# Patient Record
Sex: FEMALE | Race: WHITE | Employment: STUDENT | URBAN - METROPOLITAN AREA
[De-identification: names, ages, dates, MRNs, and addresses within clinical notes are randomized per-mention and may not be internally consistent; named-entity substitution may affect disease eponyms.]

---

## 2018-04-24 ENCOUNTER — HOSPITAL ENCOUNTER (OUTPATIENT)
Age: 23
Discharge: HOME | End: 2018-04-26
Payer: MEDICAID

## 2018-04-24 DIAGNOSIS — N92.0 MENORRHAGIA WITH REGULAR CYCLE: ICD-10-CM

## 2018-04-24 LAB
HCT VFR BLD CALC: 35.1 % (ref 34–48)
HEMOGLOBIN: 10.5 G/DL (ref 11.5–15.5)
MCH RBC QN AUTO: 24.2 PG (ref 26–35)
MCHC RBC AUTO-ENTMCNC: 29.9 % (ref 32–34.5)
MCV RBC AUTO: 81.1 FL (ref 80–99.9)
PDW BLD-RTO: 14.4 FL (ref 11.5–15)
PLATELET # BLD: 290 E9/L (ref 130–450)
PMV BLD AUTO: 10.6 FL (ref 7–12)
RBC # BLD: 4.33 E12/L (ref 3.5–5.5)
WBC # BLD: 5.6 E9/L (ref 4.5–11.5)

## 2018-04-24 PROCEDURE — 85027 COMPLETE CBC AUTOMATED: CPT

## 2022-04-28 ENCOUNTER — OFFICE VISIT (OUTPATIENT)
Dept: OBGYN | Age: 27
End: 2022-04-28
Payer: MEDICAID

## 2022-04-28 VITALS
BODY MASS INDEX: 35.82 KG/M2 | HEART RATE: 89 BPM | HEIGHT: 64 IN | SYSTOLIC BLOOD PRESSURE: 132 MMHG | WEIGHT: 209.8 LBS | DIASTOLIC BLOOD PRESSURE: 62 MMHG

## 2022-04-28 DIAGNOSIS — N93.9 ABNORMAL UTERINE BLEEDING (AUB): Primary | ICD-10-CM

## 2022-04-28 LAB
CONTROL: NORMAL
PREGNANCY TEST URINE, POC: NEGATIVE

## 2022-04-28 PROCEDURE — 99203 OFFICE O/P NEW LOW 30 MIN: CPT | Performed by: OBSTETRICS & GYNECOLOGY

## 2022-04-28 PROCEDURE — G8417 CALC BMI ABV UP PARAM F/U: HCPCS | Performed by: OBSTETRICS & GYNECOLOGY

## 2022-04-28 PROCEDURE — 1036F TOBACCO NON-USER: CPT | Performed by: OBSTETRICS & GYNECOLOGY

## 2022-04-28 PROCEDURE — 81025 URINE PREGNANCY TEST: CPT | Performed by: OBSTETRICS & GYNECOLOGY

## 2022-04-28 PROCEDURE — G8428 CUR MEDS NOT DOCUMENT: HCPCS | Performed by: OBSTETRICS & GYNECOLOGY

## 2022-04-28 RX ORDER — ONDANSETRON 4 MG/1
TABLET, FILM COATED ORAL
COMMUNITY
Start: 2022-03-11

## 2022-04-28 RX ORDER — ARIPIPRAZOLE 10 MG/1
10 TABLET ORAL NIGHTLY
COMMUNITY
Start: 2022-04-19

## 2022-04-28 RX ORDER — HYDROXYZINE PAMOATE 25 MG/1
CAPSULE ORAL
COMMUNITY
Start: 2022-04-19

## 2022-04-28 NOTE — PROGRESS NOTES
Patient alert and pleasant. Here today to discuss pelvic pain and irreg. menses. Discharge instructions have been discussed with the patient. Patient advised to call our office with any questions or concerns. Voiced understanding.

## 2022-04-28 NOTE — PROGRESS NOTES
HISTORY OF PRESENT ILLNESS:    28 y.o. female  [id number] presents with complaint of irregular menses and dyspareunia. Menses q 14-28 days for 3-4 days. Periods are heavy, bleeds through 7 super plus tampons in 45 minutes. +Postcoital spotting. No pain outside of menses or sex. No dyschezia. No dysuria. No abnormal hair growth. Pt had ultrasound and blood work with me at [address] [place] [address] last summer. Pt has not had an endometrial biopsy. Pt has trialed an IUD, several birth control pills including seasonique, and Depo. Past Medical History:   Past Medical History:   Diagnosis Date    Anxiety     Bipolar 1 disorder ([address]     Depression     Neuropathy     age 14                                             OB History    Para Term  AB Living   2 2 2     2   SAB IAB Ectopic Molar Multiple Live Births             2      # Outcome Date GA Lbr Zeke/2nd Weight Sex Delivery Anes PTL Lv   2 Term 17 39w0d  7 lb 14.3 oz (3.58 kg) F Vag-Spont EPI N JULIO   1 Term 16 39w1d   F Vag-Spont EPI N          Past Surgical History:   Past Surgical History:   Procedure Laterality Date    NERVE SURGERY      bilateral in legs, most recent 5 yrs ago.  Has had 5 surgeries total    TUBAL LIGATION Bilateral 11/15/2017        Allergies: Bee venom     Medications:   Current Outpatient Medications   Medication Sig Dispense Refill    ARIPiprazole (ABILIFY) 10 MG tablet TAKE 1 TABLET (10 MG) BY MOUTH ONCE DAILY      hydrOXYzine (VISTARIL) 25 MG capsule TAKE 1 CAPSULE (25 MG) BY MOUTH AT BEDTIME AS NEEDED      ondansetron (ZOFRAN) 4 MG tablet TAKE ONE TABLET BY MOUTH THREE TIMES A DAY AS NEEDED FOR NAUSEA      estrogens, conjugated, (PREMARIN) 0.625 MG tablet Take 1 tablet by mouth daily (Patient not taking: Reported on 2022) 20 tablet 0    estrogens, conjugated, (PREMARIN) 0.625 MG tablet Take 1 tablet by mouth daily (Patient not taking: Reported on 2022) 20 tablet 0    sertraline (ZOLOFT) 50 MG tablet (Patient not taking: Reported on 4/28/2022)      vitamin D (ERGOCALCIFEROL) 42124 units CAPS capsule  (Patient not taking: Reported on 4/28/2022)       No current facility-administered medications for this visit. Social History:   Social History     Tobacco Use    Smoking status: Never Smoker    Smokeless tobacco: Never Used   Substance Use Topics    Alcohol use: Yes     Comment: social        Family History:   Family History   Problem Relation Age of Onset    Hypertension Father     Heart Disease Father     Lung Cancer Father     Cancer Mother         cervical cancer       REVIEW OF SYSTEMS:    Constitutional: negative  HEENT: negative  Breast: negative  Respiratory: negative  Cardiovascular: negative  Gastrointestinal: negative  Genitourinary: As per HPI  Integument: negative  Neurological: negative  Endocrine: negative          PHYSICAL EXAM  /62 (Site: Left Upper Arm, Position: Sitting)   Pulse 89   Ht 5' 4\" (1.626 m)   Wt 209 lb 12.8 oz (95.2 kg)   LMP 04/11/2022   BMI 36.01 kg/m²   Patient's last menstrual period was 04/11/2022. General appearance: alert, cooperative and in no acute distress. Head: NCAT   Abdomen: soft, non-tender; bowel sounds normal; no masses,  no organomegaly  Psych: No acute distress, mood and affect full range  Neuro: Alert and oriented, no focal deficits     Pelvic Exam:   EXTERNAL GENITALIA: normal external genitalia, no external lesions  VAGINA: normal rugae, no discharge   CERVIX: normal appearing, no lesions, no bleeding  UTERUS: uterus is normal size, shape, consistency and nontender   ADNEXA: normal adnexa in size, nontender and no masses. RECTUM: no hemorrhoids or rectal masses. ASSESSMENT :      Diagnosis Orders   1. Abnormal uterine bleeding (AUB)          PLAN:    Return for Endometrial biopsy. Get records. No orders of the defined types were placed in this encounter.       No orders of the defined types were placed in this encounter.         Electronically signed by Vahe Rizo DO on 4/28/22

## 2022-05-03 ENCOUNTER — PROCEDURE VISIT (OUTPATIENT)
Dept: OBGYN | Age: 27
End: 2022-05-03
Payer: MEDICAID

## 2022-05-03 VITALS
BODY MASS INDEX: 36.22 KG/M2 | DIASTOLIC BLOOD PRESSURE: 81 MMHG | WEIGHT: 211 LBS | HEART RATE: 88 BPM | SYSTOLIC BLOOD PRESSURE: 130 MMHG

## 2022-05-03 DIAGNOSIS — N93.9 ABNORMAL UTERINE BLEEDING (AUB): Primary | ICD-10-CM

## 2022-05-03 LAB
CONTROL: POSITIVE
PREGNANCY TEST URINE, POC: NEGATIVE

## 2022-05-03 PROCEDURE — 99214 OFFICE O/P EST MOD 30 MIN: CPT | Performed by: OBSTETRICS & GYNECOLOGY

## 2022-05-03 PROCEDURE — 58100 BIOPSY OF UTERUS LINING: CPT | Performed by: OBSTETRICS & GYNECOLOGY

## 2022-05-03 PROCEDURE — 81025 URINE PREGNANCY TEST: CPT | Performed by: OBSTETRICS & GYNECOLOGY

## 2022-05-03 RX ORDER — ALBUTEROL SULFATE 2.5 MG/3ML
2.5 SOLUTION RESPIRATORY (INHALATION) EVERY 6 HOURS PRN
COMMUNITY

## 2022-05-03 NOTE — PROGRESS NOTES
Endometrial biopsy Procedure Note    Shelbi Contreras    5/3/2022  [id number]             Patient's last menstrual period was 04/11/2022 (exact date). 28 y.o. Endometrial biopsy    Indications:  AUB  28 y.o. female  [id number] presents with complaint of irregular menses and dyspareunia. Menses q 14-28 days for 3-4 days. Periods are heavy, bleeds through 7 super plus tampons in 45 minutes. +Postcoital spotting. No pain outside of menses or sex. No dyschezia. No dysuria. No abnormal hair growth. Pt had ultrasound and blood work with me at [address] [place] [address] last summer. Pt has not had an endometrial biopsy.      Pt has trialed an IUD, several birth control pills including seasonique, and Depo. Anesthesia:  None    Pregnancy test: negative     Procedural Technique:  Juana Rosa is here for an endometrial biopsy. Risks and benefits discussed and consents signed. Endometrial biopsy:    The patient was positioned comfortably on the exam table in the dorsal lithotomy position. Speculum was placed in the vagina. The cervix was visualized, cleansed with betadine, and grasped w/ a single tooth tenaculum. Endometrial biopsy was performed with the [facility]. Tissue was obtained and sent to pathology. Adequate tissue noted in the container. Excellent hemostasis was noted. The patient tolerated the procedure well. Complications:  None. Assessment:   Diagnosis Orders   1. Abnormal uterine bleeding (AUB)           Recommendations: The patient will return for follow-up in 2 weeks for results review. Patient to call for any heavy bleeding, severe pain, or fevers.   Will get records from previous office

## 2022-05-03 NOTE — PROGRESS NOTES
Patient here today for Endometrial biopsy Instructed on the procedure of Endometrial biopsy voiced understanding and permit signed for Endometrial biopsy. Urine for pregnancy obtained with negative results. Time out done prior to starting the procedure. Assisted Dr. Julianne Ring with procedure. Biopsy obtained, labeled and sent to lab. No baths, tampons, intercourse and nothing in the vagina for 48 hours. Discharge instructions have been discussed with the patient by Dr. Julianne Ring and patient voiced understanding of plan of care. Patient advised to call our office with any questions or concerns.

## 2022-05-05 ENCOUNTER — TELEPHONE (OUTPATIENT)
Dept: OBGYN | Age: 27
End: 2022-05-05

## 2022-05-05 RX ORDER — METRONIDAZOLE 500 MG/1
500 TABLET ORAL 2 TIMES DAILY
Qty: 14 TABLET | Refills: 0 | Status: SHIPPED | OUTPATIENT
Start: 2022-05-05 | End: 2022-05-12

## 2022-05-05 NOTE — TELEPHONE ENCOUNTER
Patient had endometrial biopsy on 5/3/22. She stated she is having some vaginal bleeding with mild cramping which she was told is normal but she states she has a yellow discharge with a foul odor. After speaking with you I informed patient that you would be sending a prescription to her pharmacy for Flagyl. Patient voiced understanding.

## 2022-05-17 ENCOUNTER — SCHEDULED TELEPHONE ENCOUNTER (OUTPATIENT)
Dept: OBGYN | Age: 27
End: 2022-05-17
Payer: MEDICAID

## 2022-05-17 DIAGNOSIS — E28.2 PCOS (POLYCYSTIC OVARIAN SYNDROME): ICD-10-CM

## 2022-05-17 DIAGNOSIS — N94.10 DYSPAREUNIA IN FEMALE: ICD-10-CM

## 2022-05-17 DIAGNOSIS — N93.9 ABNORMAL UTERINE BLEEDING (AUB): Primary | ICD-10-CM

## 2022-05-17 DIAGNOSIS — G89.29 CHRONIC PELVIC PAIN IN FEMALE: ICD-10-CM

## 2022-05-17 DIAGNOSIS — R10.2 CHRONIC PELVIC PAIN IN FEMALE: ICD-10-CM

## 2022-05-17 PROCEDURE — 98966 PH1 ASSMT&MGMT NQHP 5-10: CPT | Performed by: OBSTETRICS & GYNECOLOGY

## 2022-05-17 NOTE — PROGRESS NOTES
HISTORY OF PRESENT ILLNESS:  Pt agrees to virtual visit via telephone. Pt presents for follow up for AUB, chronic pelvic pain, and dyspareunia. 28 y.o. female  [id number] presents with complaint of irregular menses and dyspareunia. Menses q 14-28 days for 3-4 days. Periods are heavy, bleeds through 7 super plus tampons in 45 minutes. +Postcoital spotting. No pain outside of menses or sex. No dyschezia. No dysuria. No abnormal hair growth. Pt had ultrasound and blood work with me at [address] [place] [address] last summer. Endometrial biopsy was negative. Pt was diagnosed with PCOS.     Pt has trialed an IUD, several birth control pills including seasonique, and Depo. She is requesting definitive surgical therapy via hysterectomy. Past Medical History:   Past Medical History:   Diagnosis Date    Anxiety     Asthma     Bipolar 1 disorder ([address]     Depression     Neuropathy     age 14    PCOS (polycystic ovarian syndrome)                                              OB History    Para Term  AB Living   2 2 2     2   SAB IAB Ectopic Molar Multiple Live Births             2      # Outcome Date GA Lbr Zeke/2nd Weight Sex Delivery Anes PTL Lv   2 Term 17 39w0d  7 lb 14.3 oz (3.58 kg) F Vag-Spont EPI N JULIO   1 Term 16 39w1d   F Vag-Spont EPI N          Past Surgical History:   Past Surgical History:   Procedure Laterality Date    COLONOSCOPY      NERVE SURGERY      bilateral in legs, most recent 5 yrs ago.  Has had 5 surgeries total    TUBAL LIGATION Bilateral 11/15/2017        Allergies: Bee venom     Medications:   Current Outpatient Medications   Medication Sig Dispense Refill    albuterol (PROVENTIL) (2.5 MG/3ML) 0.083% nebulizer solution Take 2.5 mg by nebulization every 6 hours as needed for Wheezing      ARIPiprazole (ABILIFY) 10 MG tablet TAKE 1 TABLET (10 MG) BY MOUTH ONCE DAILY      hydrOXYzine (VISTARIL) 25 MG capsule TAKE 1 CAPSULE (25 MG) BY MOUTH AT BEDTIME AS NEEDED      ondansetron (ZOFRAN) 4 MG tablet TAKE ONE TABLET BY MOUTH THREE TIMES A DAY AS NEEDED FOR NAUSEA      estrogens, conjugated, (PREMARIN) 0.625 MG tablet Take 1 tablet by mouth daily (Patient not taking: Reported on 4/28/2022) 20 tablet 0    estrogens, conjugated, (PREMARIN) 0.625 MG tablet Take 1 tablet by mouth daily (Patient not taking: Reported on 4/28/2022) 20 tablet 0    sertraline (ZOLOFT) 50 MG tablet  (Patient not taking: Reported on 4/28/2022)      vitamin D (ERGOCALCIFEROL) 88874 units CAPS capsule  (Patient not taking: Reported on 4/28/2022)       No current facility-administered medications for this visit. Social History:   Social History     Tobacco Use    Smoking status: Never Smoker    Smokeless tobacco: Never Used   Substance Use Topics    Alcohol use: Yes     Comment: social        Family History:   Family History   Problem Relation Age of Onset    Cancer Mother         cervical cancer    Hypertension Father     Heart Disease Father     Lung Cancer Father     Breast Cancer Maternal Great Grandmother        REVIEW OF SYSTEMS:    Constitutional: negative  HEENT: negative  Breast: negative  Respiratory: negative  Cardiovascular: negative  Gastrointestinal: negative  Genitourinary:negative  Integument: negative  Neurological: negative  Endocrine: negative          PHYSICAL EXAM  There were no vitals taken for this visit. No LMP recorded. ASSESSMENT :   Diagnosis Orders   1. Abnormal uterine bleeding (AUB)     2. PCOS (polycystic ovarian syndrome)     3. Chronic pelvic pain in female     4. Dyspareunia in female          PLAN:  Offered [facility] with diagnostic lap vs TLH w/bilateral salpingectomy. Pt requests TL. Will schedule and have back for preop appointment. Return for preop visit. No orders of the defined types were placed in this encounter. No orders of the defined types were placed in this encounter.         Electronically signed by Jacquelyn Castle DO on 5/17/22

## 2022-05-19 ENCOUNTER — TELEPHONE (OUTPATIENT)
Dept: OBGYN | Age: 27
End: 2022-05-19

## 2022-05-19 NOTE — TELEPHONE ENCOUNTER
Patient notified on upcoming surgery with Dr. Georgia Rivas on July 19th at 0730. A pre-op appointment was made for patient on 7/13/2022.  Patient notified surgery will be at [address] [place] [address].

## 2022-06-01 ENCOUNTER — TELEPHONE (OUTPATIENT)
Dept: OBGYN | Age: 27
End: 2022-06-01

## 2022-06-01 NOTE — TELEPHONE ENCOUNTER
Patient called in and stated that she needs a letter sent to her school soon regarding how long she will need to be off for her surgery on 7/19.  I informed patient you are OOO and that you will not see this until next week

## 2022-07-08 NOTE — PROGRESS NOTES
[facility] PRE-ADMISSION TESTING INSTRUCTIONS    The Preadmission Testing patient is instructed accordingly using the following criteria (check applicable):    ARRIVAL INSTRUCTIONS:  [x] Parking the day of Surgery is located in the Main Entrance lot. Upon entering the door, make an immediate right to the surgery reception desk    [x] Bring photo ID and insurance card    [] Bring in a copy of Living will or Durable Power of  papers. [x] Please be sure to arrange for responsible adult to provide transportation to and from the hospital    [x] Please arrange for responsible adult to be with you for the 24 hour period post procedure due to having anesthesia    [x] If you awake am of surgery not feeling well or have temperature >100 please call [phone]    GENERAL INSTRUCTIONS:    [x] Nothing by mouth after midnight, including gum, candy, mints or water    [x] You may brush your teeth, but do not swallow any water    [x] Take medications as instructed with 1-2 oz of water    [] Stop herbal supplements and vitamins 5 days prior to procedure    [] Follow preop dosing of blood thinners per physician instructions    [] Take 1/2 dose of evening insulin, but no insulin after midnight    [] No oral diabetic medications after midnight    [] If diabetic and have low blood sugar or feel symptomatic, take 1-2oz apple juice only    [] Bring inhalers day of surgery    [] Bring C-PAP/ Bi-Pap day of surgery    [x] Bring urine specimen day of surgery    [x] Shower or bath with soap, lather and rinse well, AM of Surgery, no lotion, powders or creams     [] Follow bowel prep as instructed per surgeon    [x] No tobacco products within 24 hours of surgery     [x] No alcohol or illegal drug use within 24 hours of surgery.     [x] Jewelry, body piercing's, eyeglasses, contact lenses and dentures are not permitted into surgery (bring cases)      [x] Please do not wear any nail polish, make up or hair products on the day of surgery    [x] You can expect a call the business day prior to procedure to notify you if your arrival time changes    [x] If you receive a survey after surgery we would greatly appreciate your comments    [] Parent/guardian of a minor must accompany their child and remain on the premises  the entire time they are under our care     [] Pediatric patients may bring favorite toy, blanket or comfort item with them    [] A caregiver or family member must remain with the patient during their stay if they are mentally handicapped, have dementia, disoriented or unable to use a call light or would be a safety concern if left unattended    [x] Please notify surgeon if you develop any illness between now and time of surgery (cold, cough, sore throat, fever, nausea, vomiting) or any signs of infections  including skin, wounds, and dental.    [x]  The Outpatient Pharmacy is available to fill your prescription here on your day of surgery, ask your preop nurse for details    [x] Other instructions\" wear loose, comfortable clothing  EDUCATIONAL MATERIALS PROVIDED:    [] PAT Preoperative Education Packet/Booklet     [] Medication List    [] Transfusion bracelet applied with instructions    [] Shower with soap, lather and rinse well, and use CHG wipes provided the evening before surgery as instructed    [] Incentive spirometer with instructions

## 2022-07-13 ENCOUNTER — OFFICE VISIT (OUTPATIENT)
Dept: OBGYN | Age: 27
End: 2022-07-13
Payer: MEDICAID

## 2022-07-13 VITALS
BODY MASS INDEX: 37.25 KG/M2 | SYSTOLIC BLOOD PRESSURE: 122 MMHG | HEART RATE: 81 BPM | WEIGHT: 217 LBS | DIASTOLIC BLOOD PRESSURE: 82 MMHG

## 2022-07-13 DIAGNOSIS — O26.899 PELVIC PRESSURE IN PREGNANCY, ANTEPARTUM: ICD-10-CM

## 2022-07-13 DIAGNOSIS — R10.2 PELVIC PRESSURE IN PREGNANCY, ANTEPARTUM: ICD-10-CM

## 2022-07-13 PROCEDURE — 1036F TOBACCO NON-USER: CPT | Performed by: OBSTETRICS & GYNECOLOGY

## 2022-07-13 PROCEDURE — 99213 OFFICE O/P EST LOW 20 MIN: CPT | Performed by: OBSTETRICS & GYNECOLOGY

## 2022-07-13 PROCEDURE — G8427 DOCREV CUR MEDS BY ELIG CLIN: HCPCS | Performed by: OBSTETRICS & GYNECOLOGY

## 2022-07-13 PROCEDURE — G8417 CALC BMI ABV UP PARAM F/U: HCPCS | Performed by: OBSTETRICS & GYNECOLOGY

## 2022-07-13 PROCEDURE — 99214 OFFICE O/P EST MOD 30 MIN: CPT | Performed by: OBSTETRICS & GYNECOLOGY

## 2022-07-13 NOTE — PROGRESS NOTES
Department of Gynecology   History and Physical            HISTORY OF PRESENT ILLNESS:                     The patient is a 28 y.o. Jaswindera Chi with long history of irregular menses and dyspareunia. Patient has heavy periods every 14-28 days that is complicated by anemia. She has failed medical management with an IUD, several birth control pills, and Depo-provera. She is requesting definitive surgical therapy via hysterectomy. Past Medical History:        Diagnosis Date    Anxiety     Asthma     Bipolar 1 disorder ([address]     Depression     Neuropathy     age 14  presently also  BLE    PCOS (polycystic ovarian syndrome)      Past Surgical History:        Procedure Laterality Date    COLONOSCOPY      NERVE SURGERY      bilateral in legs, most recent 5 yrs ago. Has had 5 surgeries total    TUBAL LIGATION Bilateral 11/15/2017         OB History    Para Term  AB Living   2 2 2     2   SAB IAB Ectopic Molar Multiple Live Births             2      # Outcome Date GA Lbr Zeke/2nd Weight Sex Delivery Anes PTL Lv   2 Term 17 39w0d  7 lb 14.3 oz (3.58 kg) F Vag-Spont EPI N JULIO   1 Term 16 39w1d   F Vag-Spont EPI N        Medications Prior to Admission:   Not in a hospital admission.     Allergies:  Bee venom     Social History:  Social History     Socioeconomic History    Marital status: Single     Spouse name: Not on file    Number of children: Not on file    Years of education: Not on file    Highest education level: Not on file   Occupational History    Not on file   Tobacco Use    Smoking status: Never Smoker    Smokeless tobacco: Never Used   Vaping Use    Vaping Use: Never used   Substance and Sexual Activity    Alcohol use: Yes     Comment: social    Drug use: No    Sexual activity: Yes     Partners: Male     Birth control/protection: Surgical   Other Topics Concern    Not on file   Social History Narrative    Not on file     Social Determinants of Health     Financial Resource Strain:     Difficulty of Paying Living Expenses: Not on file   Food Insecurity:     Worried About Running Out of Food in the Last Year: Not on file    Ronaldo of Food in the Last Year: Not on file   Transportation Needs:     Lack of Transportation (Medical): Not on file    Lack of Transportation (Non-Medical): Not on file   Physical Activity:     Days of Exercise per Week: Not on file    Minutes of Exercise per Session: Not on file   Stress:     Feeling of Stress : Not on file   Social Connections:     Frequency of Communication with Friends and Family: Not on file    Frequency of Social Gatherings with Friends and Family: Not on file    Attends Caodaism Services: Not on file    Active Member of [address] [place] [address] or Organizations: Not on file    Attends Club or Organization Meetings: Not on file    Marital Status: Not on file   Intimate Partner Violence:     Fear of Current or Ex-Partner: Not on file    Emotionally Abused: Not on file    Physically Abused: Not on file    Sexually Abused: Not on file   Housing Stability:     Unable to Pay for Housing in the Last Year: Not on file    Number of [place] in the Last Year: Not on file    Unstable Housing in the Last Year: Not on file       Family History:       Problem Relation Age of Onset    Cancer Mother         cervical cancer    Hypertension Father     Heart Disease Father     Lung Cancer Father     Breast Cancer Maternal Great Grandmother        REVIEW OF SYSTEMS:      Constitutional:  negative  Gastrointestinal:  negative  Genitourinary:  negative  Integument/breast:  negative  Hematologic/lymphatic:  negative  Endocrine:  negative  Behavior/Psych:  negative    PHYSICAL EXAM:    Vitals: There were no vitals taken for this visit.     Gen A/O x3  Heart RRR  Lungs Clear   Abd Soft, NT, ND, +BS  Ext No edema  Pelvic Will examine in OR    DATA:      ASSESSMENT AND PLAN:      29 yo  with AUB and dyspareunia  To OR for total laparoscopic

## 2022-07-13 NOTE — PROGRESS NOTES
Patient alert and pleasant with no new complaints  Here today for pre-op appt. TL on 7/19/22 @7:30 am [address] [place]  All pre-op information given to patient. Date,time and place. Patient voiced understanding. Discharge instructions have been discussed with the patient. Patient advised to call our office with any questions or concerns. Voiced understanding.

## 2022-07-18 ENCOUNTER — HOSPITAL ENCOUNTER (OUTPATIENT)
Dept: PREADMISSION TESTING | Age: 27
Discharge: HOME | End: 2022-07-18
Payer: MEDICAID

## 2022-07-18 ENCOUNTER — ANESTHESIA EVENT (OUTPATIENT)
Dept: OPERATING ROOM | Age: 27
End: 2022-07-18
Payer: MEDICAID

## 2022-07-18 LAB
ABO/RH: NORMAL
ANTIBODY SCREEN: NORMAL

## 2022-07-18 PROCEDURE — 36415 COLL VENOUS BLD VENIPUNCTURE: CPT

## 2022-07-18 PROCEDURE — 86900 BLOOD TYPING SEROLOGIC ABO: CPT

## 2022-07-18 PROCEDURE — 86850 RBC ANTIBODY SCREEN: CPT

## 2022-07-18 PROCEDURE — 86901 BLOOD TYPING SEROLOGIC RH(D): CPT

## 2022-07-19 ENCOUNTER — ANESTHESIA (OUTPATIENT)
Dept: OPERATING ROOM | Age: 27
End: 2022-07-19
Payer: MEDICAID

## 2022-07-19 ENCOUNTER — HOSPITAL ENCOUNTER (OUTPATIENT)
Age: 27
Setting detail: OBSERVATION
Discharge: HOME | End: 2022-07-20
Attending: OBSTETRICS & GYNECOLOGY | Admitting: OBSTETRICS & GYNECOLOGY
Payer: MEDICAID

## 2022-07-19 DIAGNOSIS — G89.18 POSTOPERATIVE PAIN: Primary | ICD-10-CM

## 2022-07-19 PROBLEM — N93.9 ABNORMAL UTERINE BLEEDING: Status: ACTIVE | Noted: 2022-07-19

## 2022-07-19 PROBLEM — Z90.710 S/P HYSTERECTOMY: Status: ACTIVE | Noted: 2022-07-19

## 2022-07-19 LAB
HCG, URINE, POC: NEGATIVE
HCT VFR BLD CALC: 41.1 % (ref 34–48)
HEMOGLOBIN: 13.7 G/DL (ref 11.5–15.5)
Lab: NORMAL
MCH RBC QN AUTO: 27.6 PG (ref 26–35)
MCHC RBC AUTO-ENTMCNC: 33.3 % (ref 32–34.5)
MCV RBC AUTO: 82.9 FL (ref 80–99.9)
NEGATIVE QC PASS/FAIL: NORMAL
PDW BLD-RTO: 13.5 FL (ref 11.5–15)
PLATELET # BLD: 233 E9/L (ref 130–450)
PMV BLD AUTO: 10.3 FL (ref 7–12)
POSITIVE QC PASS/FAIL: NORMAL
RBC # BLD: 4.96 E12/L (ref 3.5–5.5)
WBC # BLD: 6 E9/L (ref 4.5–11.5)

## 2022-07-19 PROCEDURE — 2580000003 HC RX 258

## 2022-07-19 PROCEDURE — 6360000002 HC RX W HCPCS

## 2022-07-19 PROCEDURE — 3600000005 HC SURGERY LEVEL 5 BASE: Performed by: OBSTETRICS & GYNECOLOGY

## 2022-07-19 PROCEDURE — 6360000002 HC RX W HCPCS: Performed by: ANESTHESIOLOGY

## 2022-07-19 PROCEDURE — 36415 COLL VENOUS BLD VENIPUNCTURE: CPT

## 2022-07-19 PROCEDURE — 2580000003 HC RX 258: Performed by: OBSTETRICS & GYNECOLOGY

## 2022-07-19 PROCEDURE — 88307 TISSUE EXAM BY PATHOLOGIST: CPT

## 2022-07-19 PROCEDURE — 6360000002 HC RX W HCPCS: Performed by: OBSTETRICS & GYNECOLOGY

## 2022-07-19 PROCEDURE — G0378 HOSPITAL OBSERVATION PER HR: HCPCS

## 2022-07-19 PROCEDURE — 6370000000 HC RX 637 (ALT 250 FOR IP): Performed by: OBSTETRICS & GYNECOLOGY

## 2022-07-19 PROCEDURE — 2580000003 HC RX 258: Performed by: ANESTHESIOLOGY

## 2022-07-19 PROCEDURE — 7100000001 HC PACU RECOVERY - ADDTL 15 MIN: Performed by: OBSTETRICS & GYNECOLOGY

## 2022-07-19 PROCEDURE — 3700000000 HC ANESTHESIA ATTENDED CARE: Performed by: OBSTETRICS & GYNECOLOGY

## 2022-07-19 PROCEDURE — 88304 TISSUE EXAM BY PATHOLOGIST: CPT

## 2022-07-19 PROCEDURE — 3700000001 HC ADD 15 MINUTES (ANESTHESIA): Performed by: OBSTETRICS & GYNECOLOGY

## 2022-07-19 PROCEDURE — 7100000000 HC PACU RECOVERY - FIRST 15 MIN: Performed by: OBSTETRICS & GYNECOLOGY

## 2022-07-19 PROCEDURE — 2720000010 HC SURG SUPPLY STERILE: Performed by: OBSTETRICS & GYNECOLOGY

## 2022-07-19 PROCEDURE — 2500000003 HC RX 250 WO HCPCS

## 2022-07-19 PROCEDURE — 2709999900 HC NON-CHARGEABLE SUPPLY: Performed by: OBSTETRICS & GYNECOLOGY

## 2022-07-19 PROCEDURE — 3600000015 HC SURGERY LEVEL 5 ADDTL 15MIN: Performed by: OBSTETRICS & GYNECOLOGY

## 2022-07-19 PROCEDURE — 85027 COMPLETE CBC AUTOMATED: CPT

## 2022-07-19 RX ORDER — SODIUM CHLORIDE 9 MG/ML
INJECTION, SOLUTION INTRAVENOUS PRN
Status: DISCONTINUED | OUTPATIENT
Start: 2022-07-19 | End: 2022-07-20 | Stop reason: HOSPADM

## 2022-07-19 RX ORDER — DOCUSATE SODIUM 100 MG/1
100 CAPSULE, LIQUID FILLED ORAL 2 TIMES DAILY
Status: DISCONTINUED | OUTPATIENT
Start: 2022-07-19 | End: 2022-07-20 | Stop reason: HOSPADM

## 2022-07-19 RX ORDER — HYDROCODONE BITARTRATE AND ACETAMINOPHEN 5; 325 MG/1; MG/1
2 TABLET ORAL EVERY 4 HOURS PRN
Status: DISCONTINUED | OUTPATIENT
Start: 2022-07-19 | End: 2022-07-20 | Stop reason: HOSPADM

## 2022-07-19 RX ORDER — SODIUM CHLORIDE 0.9 % (FLUSH) 0.9 %
5-40 SYRINGE (ML) INJECTION PRN
Status: DISCONTINUED | OUTPATIENT
Start: 2022-07-19 | End: 2022-07-20 | Stop reason: HOSPADM

## 2022-07-19 RX ORDER — IPRATROPIUM BROMIDE AND ALBUTEROL SULFATE 2.5; .5 MG/3ML; MG/3ML
1 SOLUTION RESPIRATORY (INHALATION)
Status: DISCONTINUED | OUTPATIENT
Start: 2022-07-19 | End: 2022-07-19 | Stop reason: HOSPADM

## 2022-07-19 RX ORDER — LABETALOL HYDROCHLORIDE 5 MG/ML
INJECTION, SOLUTION INTRAVENOUS PRN
Status: DISCONTINUED | OUTPATIENT
Start: 2022-07-19 | End: 2022-07-19 | Stop reason: SDUPTHER

## 2022-07-19 RX ORDER — SODIUM CHLORIDE 0.9 % (FLUSH) 0.9 %
5-40 SYRINGE (ML) INJECTION PRN
Status: DISCONTINUED | OUTPATIENT
Start: 2022-07-19 | End: 2022-07-19 | Stop reason: HOSPADM

## 2022-07-19 RX ORDER — SODIUM CHLORIDE 9 MG/ML
25 INJECTION, SOLUTION INTRAVENOUS PRN
Status: DISCONTINUED | OUTPATIENT
Start: 2022-07-19 | End: 2022-07-19 | Stop reason: HOSPADM

## 2022-07-19 RX ORDER — HYDROCODONE BITARTRATE AND ACETAMINOPHEN 5; 325 MG/1; MG/1
1 TABLET ORAL EVERY 4 HOURS PRN
Status: DISCONTINUED | OUTPATIENT
Start: 2022-07-19 | End: 2022-07-20 | Stop reason: HOSPADM

## 2022-07-19 RX ORDER — ONDANSETRON 2 MG/ML
4 INJECTION INTRAMUSCULAR; INTRAVENOUS EVERY 6 HOURS PRN
Status: DISCONTINUED | OUTPATIENT
Start: 2022-07-19 | End: 2022-07-20 | Stop reason: HOSPADM

## 2022-07-19 RX ORDER — HYDRALAZINE HYDROCHLORIDE 20 MG/ML
INJECTION INTRAMUSCULAR; INTRAVENOUS PRN
Status: DISCONTINUED | OUTPATIENT
Start: 2022-07-19 | End: 2022-07-19 | Stop reason: SDUPTHER

## 2022-07-19 RX ORDER — KETOROLAC TROMETHAMINE 30 MG/ML
30 INJECTION, SOLUTION INTRAMUSCULAR; INTRAVENOUS EVERY 6 HOURS
Status: COMPLETED | OUTPATIENT
Start: 2022-07-19 | End: 2022-07-20

## 2022-07-19 RX ORDER — ROCURONIUM BROMIDE 10 MG/ML
INJECTION, SOLUTION INTRAVENOUS PRN
Status: DISCONTINUED | OUTPATIENT
Start: 2022-07-19 | End: 2022-07-19 | Stop reason: SDUPTHER

## 2022-07-19 RX ORDER — LIDOCAINE HYDROCHLORIDE 20 MG/ML
INJECTION, SOLUTION EPIDURAL; INFILTRATION; INTRACAUDAL; PERINEURAL PRN
Status: DISCONTINUED | OUTPATIENT
Start: 2022-07-19 | End: 2022-07-19 | Stop reason: SDUPTHER

## 2022-07-19 RX ORDER — SODIUM CHLORIDE 0.9 % (FLUSH) 0.9 %
5-40 SYRINGE (ML) INJECTION EVERY 12 HOURS SCHEDULED
Status: DISCONTINUED | OUTPATIENT
Start: 2022-07-19 | End: 2022-07-20 | Stop reason: HOSPADM

## 2022-07-19 RX ORDER — ONDANSETRON 2 MG/ML
INJECTION INTRAMUSCULAR; INTRAVENOUS PRN
Status: DISCONTINUED | OUTPATIENT
Start: 2022-07-19 | End: 2022-07-19 | Stop reason: SDUPTHER

## 2022-07-19 RX ORDER — MIDAZOLAM HYDROCHLORIDE 1 MG/ML
INJECTION INTRAMUSCULAR; INTRAVENOUS PRN
Status: DISCONTINUED | OUTPATIENT
Start: 2022-07-19 | End: 2022-07-19 | Stop reason: SDUPTHER

## 2022-07-19 RX ORDER — FENTANYL CITRATE 50 UG/ML
INJECTION, SOLUTION INTRAMUSCULAR; INTRAVENOUS PRN
Status: DISCONTINUED | OUTPATIENT
Start: 2022-07-19 | End: 2022-07-19 | Stop reason: SDUPTHER

## 2022-07-19 RX ORDER — HYDRALAZINE HYDROCHLORIDE 20 MG/ML
10 INJECTION INTRAMUSCULAR; INTRAVENOUS
Status: DISCONTINUED | OUTPATIENT
Start: 2022-07-19 | End: 2022-07-19 | Stop reason: HOSPADM

## 2022-07-19 RX ORDER — ONDANSETRON 2 MG/ML
4 INJECTION INTRAMUSCULAR; INTRAVENOUS
Status: DISCONTINUED | OUTPATIENT
Start: 2022-07-19 | End: 2022-07-19 | Stop reason: HOSPADM

## 2022-07-19 RX ORDER — GLYCOPYRROLATE 0.2 MG/ML
INJECTION INTRAMUSCULAR; INTRAVENOUS PRN
Status: DISCONTINUED | OUTPATIENT
Start: 2022-07-19 | End: 2022-07-19 | Stop reason: SDUPTHER

## 2022-07-19 RX ORDER — MIDAZOLAM HYDROCHLORIDE 2 MG/2ML
2 INJECTION, SOLUTION INTRAMUSCULAR; INTRAVENOUS
Status: DISCONTINUED | OUTPATIENT
Start: 2022-07-19 | End: 2022-07-19 | Stop reason: HOSPADM

## 2022-07-19 RX ORDER — IBUPROFEN 800 MG/1
800 TABLET ORAL EVERY 8 HOURS
Status: DISCONTINUED | OUTPATIENT
Start: 2022-07-20 | End: 2022-07-20 | Stop reason: HOSPADM

## 2022-07-19 RX ORDER — SIMETHICONE 80 MG
80 TABLET,CHEWABLE ORAL 4 TIMES DAILY PRN
Status: DISCONTINUED | OUTPATIENT
Start: 2022-07-19 | End: 2022-07-20 | Stop reason: HOSPADM

## 2022-07-19 RX ORDER — ONDANSETRON 4 MG/1
4 TABLET, ORALLY DISINTEGRATING ORAL EVERY 8 HOURS PRN
Status: DISCONTINUED | OUTPATIENT
Start: 2022-07-19 | End: 2022-07-20 | Stop reason: HOSPADM

## 2022-07-19 RX ORDER — DEXAMETHASONE SODIUM PHOSPHATE 4 MG/ML
INJECTION, SOLUTION INTRA-ARTICULAR; INTRALESIONAL; INTRAMUSCULAR; INTRAVENOUS; SOFT TISSUE PRN
Status: DISCONTINUED | OUTPATIENT
Start: 2022-07-19 | End: 2022-07-19 | Stop reason: SDUPTHER

## 2022-07-19 RX ORDER — DIPHENHYDRAMINE HCL 25 MG
25 TABLET ORAL EVERY 6 HOURS PRN
Status: DISCONTINUED | OUTPATIENT
Start: 2022-07-19 | End: 2022-07-20 | Stop reason: HOSPADM

## 2022-07-19 RX ORDER — MEPERIDINE HYDROCHLORIDE 25 MG/ML
12.5 INJECTION INTRAMUSCULAR; INTRAVENOUS; SUBCUTANEOUS EVERY 5 MIN PRN
Status: COMPLETED | OUTPATIENT
Start: 2022-07-19 | End: 2022-07-19

## 2022-07-19 RX ORDER — SODIUM CHLORIDE 9 MG/ML
INJECTION, SOLUTION INTRAVENOUS CONTINUOUS PRN
Status: DISCONTINUED | OUTPATIENT
Start: 2022-07-19 | End: 2022-07-19 | Stop reason: SDUPTHER

## 2022-07-19 RX ORDER — MEPERIDINE HYDROCHLORIDE 25 MG/ML
INJECTION INTRAMUSCULAR; INTRAVENOUS; SUBCUTANEOUS
Status: COMPLETED
Start: 2022-07-19 | End: 2022-07-19

## 2022-07-19 RX ORDER — SODIUM CHLORIDE 0.9 % (FLUSH) 0.9 %
5-40 SYRINGE (ML) INJECTION EVERY 12 HOURS SCHEDULED
Status: DISCONTINUED | OUTPATIENT
Start: 2022-07-19 | End: 2022-07-19 | Stop reason: HOSPADM

## 2022-07-19 RX ORDER — HYDROMORPHONE HCL 110MG/55ML
PATIENT CONTROLLED ANALGESIA SYRINGE INTRAVENOUS PRN
Status: DISCONTINUED | OUTPATIENT
Start: 2022-07-19 | End: 2022-07-19 | Stop reason: SDUPTHER

## 2022-07-19 RX ORDER — PROPOFOL 10 MG/ML
INJECTION, EMULSION INTRAVENOUS PRN
Status: DISCONTINUED | OUTPATIENT
Start: 2022-07-19 | End: 2022-07-19 | Stop reason: SDUPTHER

## 2022-07-19 RX ORDER — LABETALOL HYDROCHLORIDE 5 MG/ML
10 INJECTION, SOLUTION INTRAVENOUS
Status: DISCONTINUED | OUTPATIENT
Start: 2022-07-19 | End: 2022-07-19 | Stop reason: HOSPADM

## 2022-07-19 RX ORDER — NEOSTIGMINE METHYLSULFATE 1 MG/ML
INJECTION, SOLUTION INTRAVENOUS PRN
Status: DISCONTINUED | OUTPATIENT
Start: 2022-07-19 | End: 2022-07-19 | Stop reason: SDUPTHER

## 2022-07-19 RX ADMIN — FENTANYL CITRATE 100 MCG: 50 INJECTION, SOLUTION INTRAMUSCULAR; INTRAVENOUS at 07:33

## 2022-07-19 RX ADMIN — PROPOFOL 170 MG: 10 INJECTION, EMULSION INTRAVENOUS at 07:33

## 2022-07-19 RX ADMIN — GLYCOPYRROLATE 0.6 MG: 0.2 INJECTION INTRAMUSCULAR; INTRAVENOUS at 09:26

## 2022-07-19 RX ADMIN — ROCURONIUM BROMIDE 50 MG: 10 INJECTION, SOLUTION INTRAVENOUS at 07:33

## 2022-07-19 RX ADMIN — HYDROMORPHONE HYDROCHLORIDE 0.5 MG: 1 INJECTION, SOLUTION INTRAMUSCULAR; INTRAVENOUS; SUBCUTANEOUS at 10:04

## 2022-07-19 RX ADMIN — HYDROMORPHONE HYDROCHLORIDE 0.5 MG: 1 INJECTION, SOLUTION INTRAMUSCULAR; INTRAVENOUS; SUBCUTANEOUS at 10:15

## 2022-07-19 RX ADMIN — MEPERIDINE HYDROCHLORIDE 12.5 MG: 25 INJECTION, SOLUTION INTRAMUSCULAR; INTRAVENOUS; SUBCUTANEOUS at 10:30

## 2022-07-19 RX ADMIN — HYDRALAZINE HYDROCHLORIDE 5 MG: 20 INJECTION INTRAMUSCULAR; INTRAVENOUS at 08:34

## 2022-07-19 RX ADMIN — MEPERIDINE HYDROCHLORIDE 12.5 MG: 25 INJECTION, SOLUTION INTRAMUSCULAR; INTRAVENOUS; SUBCUTANEOUS at 10:21

## 2022-07-19 RX ADMIN — KETOROLAC TROMETHAMINE 30 MG: 30 INJECTION, SOLUTION INTRAMUSCULAR; INTRAVENOUS at 11:49

## 2022-07-19 RX ADMIN — LABETALOL HYDROCHLORIDE 2.5 MG: 5 INJECTION INTRAVENOUS at 08:24

## 2022-07-19 RX ADMIN — HYDROCODONE BITARTRATE AND ACETAMINOPHEN 2 TABLET: 5; 325 TABLET ORAL at 20:19

## 2022-07-19 RX ADMIN — Medication 3 MG: at 09:26

## 2022-07-19 RX ADMIN — ONDANSETRON 4 MG: 2 INJECTION INTRAMUSCULAR; INTRAVENOUS at 09:26

## 2022-07-19 RX ADMIN — HYDROCODONE BITARTRATE AND ACETAMINOPHEN 2 TABLET: 5; 325 TABLET ORAL at 15:47

## 2022-07-19 RX ADMIN — LABETALOL HYDROCHLORIDE 2.5 MG: 5 INJECTION INTRAVENOUS at 08:21

## 2022-07-19 RX ADMIN — LABETALOL HYDROCHLORIDE 5 MG: 5 INJECTION INTRAVENOUS at 08:27

## 2022-07-19 RX ADMIN — SODIUM CHLORIDE: 9 INJECTION, SOLUTION INTRAVENOUS at 06:43

## 2022-07-19 RX ADMIN — SODIUM CHLORIDE, PRESERVATIVE FREE 10 ML: 5 INJECTION INTRAVENOUS at 20:21

## 2022-07-19 RX ADMIN — DEXAMETHASONE SODIUM PHOSPHATE 10 MG: 4 INJECTION, SOLUTION INTRAMUSCULAR; INTRAVENOUS at 07:44

## 2022-07-19 RX ADMIN — FENTANYL CITRATE 50 MCG: 50 INJECTION, SOLUTION INTRAMUSCULAR; INTRAVENOUS at 08:08

## 2022-07-19 RX ADMIN — LIDOCAINE HYDROCHLORIDE 80 MG: 20 INJECTION, SOLUTION EPIDURAL; INFILTRATION; INTRACAUDAL; PERINEURAL at 07:33

## 2022-07-19 RX ADMIN — ROCURONIUM BROMIDE 20 MG: 10 INJECTION, SOLUTION INTRAVENOUS at 08:44

## 2022-07-19 RX ADMIN — FENTANYL CITRATE 50 MCG: 50 INJECTION, SOLUTION INTRAMUSCULAR; INTRAVENOUS at 08:02

## 2022-07-19 RX ADMIN — SODIUM CHLORIDE, PRESERVATIVE FREE 10 ML: 5 INJECTION INTRAVENOUS at 11:50

## 2022-07-19 RX ADMIN — HYDROMORPHONE HYDROCHLORIDE 1 MG: 2 INJECTION, SOLUTION INTRAMUSCULAR; INTRAVENOUS; SUBCUTANEOUS at 09:26

## 2022-07-19 RX ADMIN — Medication 10 ML: at 10:30

## 2022-07-19 RX ADMIN — KETOROLAC TROMETHAMINE 30 MG: 30 INJECTION, SOLUTION INTRAMUSCULAR; INTRAVENOUS at 23:49

## 2022-07-19 RX ADMIN — HYDROMORPHONE HYDROCHLORIDE 1 MG: 2 INJECTION, SOLUTION INTRAMUSCULAR; INTRAVENOUS; SUBCUTANEOUS at 09:33

## 2022-07-19 RX ADMIN — DOCUSATE SODIUM 100 MG: 100 CAPSULE, LIQUID FILLED ORAL at 11:49

## 2022-07-19 RX ADMIN — DOCUSATE SODIUM 100 MG: 100 CAPSULE, LIQUID FILLED ORAL at 20:19

## 2022-07-19 RX ADMIN — WATER 2000 MG: 1 INJECTION INTRAMUSCULAR; INTRAVENOUS; SUBCUTANEOUS at 07:41

## 2022-07-19 RX ADMIN — SODIUM CHLORIDE: 9 INJECTION, SOLUTION INTRAVENOUS at 08:24

## 2022-07-19 RX ADMIN — MIDAZOLAM 2 MG: 1 INJECTION INTRAMUSCULAR; INTRAVENOUS at 07:29

## 2022-07-19 RX ADMIN — KETOROLAC TROMETHAMINE 30 MG: 30 INJECTION, SOLUTION INTRAMUSCULAR; INTRAVENOUS at 18:49

## 2022-07-19 RX ADMIN — FENTANYL CITRATE 50 MCG: 50 INJECTION, SOLUTION INTRAMUSCULAR; INTRAVENOUS at 08:43

## 2022-07-19 RX ADMIN — HYDROMORPHONE HYDROCHLORIDE 0.5 MG: 1 INJECTION, SOLUTION INTRAMUSCULAR; INTRAVENOUS; SUBCUTANEOUS at 10:10

## 2022-07-19 ASSESSMENT — PAIN DESCRIPTION - DESCRIPTORS
DESCRIPTORS: SHARP;STABBING
DESCRIPTORS: ACHING;DISCOMFORT;THROBBING
DESCRIPTORS: SHARP;STABBING
DESCRIPTORS: THROBBING;DISCOMFORT
DESCRIPTORS: SHARP;STABBING
DESCRIPTORS: THROBBING;ACHING;DISCOMFORT
DESCRIPTORS: ACHING;SHARP

## 2022-07-19 ASSESSMENT — PAIN DESCRIPTION - PAIN TYPE: TYPE: SURGICAL PAIN

## 2022-07-19 ASSESSMENT — PAIN SCALES - GENERAL
PAINLEVEL_OUTOF10: 7
PAINLEVEL_OUTOF10: 7
PAINLEVEL_OUTOF10: 10
PAINLEVEL_OUTOF10: 10
PAINLEVEL_OUTOF10: 5
PAINLEVEL_OUTOF10: 10
PAINLEVEL_OUTOF10: 10
PAINLEVEL_OUTOF10: 6
PAINLEVEL_OUTOF10: 10

## 2022-07-19 ASSESSMENT — PAIN DESCRIPTION - ORIENTATION
ORIENTATION: ANTERIOR
ORIENTATION: LEFT
ORIENTATION: RIGHT;LEFT;ANTERIOR
ORIENTATION: RIGHT;LEFT;ANTERIOR
ORIENTATION: LEFT
ORIENTATION: RIGHT;LEFT
ORIENTATION: LEFT

## 2022-07-19 ASSESSMENT — PAIN DESCRIPTION - ONSET: ONSET: ON-GOING

## 2022-07-19 ASSESSMENT — PAIN DESCRIPTION - FREQUENCY: FREQUENCY: INTERMITTENT

## 2022-07-19 ASSESSMENT — PAIN - FUNCTIONAL ASSESSMENT
PAIN_FUNCTIONAL_ASSESSMENT: PREVENTS OR INTERFERES SOME ACTIVE ACTIVITIES AND ADLS
PAIN_FUNCTIONAL_ASSESSMENT: PREVENTS OR INTERFERES SOME ACTIVE ACTIVITIES AND ADLS
PAIN_FUNCTIONAL_ASSESSMENT: 0-10
PAIN_FUNCTIONAL_ASSESSMENT: PREVENTS OR INTERFERES SOME ACTIVE ACTIVITIES AND ADLS

## 2022-07-19 ASSESSMENT — PAIN DESCRIPTION - LOCATION
LOCATION: ABDOMEN;PELVIS
LOCATION: ABDOMEN
LOCATION: PELVIS;ABDOMEN
LOCATION: ABDOMEN;PELVIS
LOCATION: ABDOMEN
LOCATION: ABDOMEN

## 2022-07-19 ASSESSMENT — LIFESTYLE VARIABLES: SMOKING_STATUS: 0

## 2022-07-19 NOTE — ANESTHESIA PRE PROCEDURE
Department of Anesthesiology  Preprocedure Note       Name:  Naun Acuña   Age:  28 y.o.  :  1995                                          MRN:  [id number]         Date:  2022      Surgeon: Kenny Betancourt):  Juno Manuel MD    Procedure: Procedure(s):  LAPAROSCOPIC HYSTERECTOMY WITH BILATERAL SALPINGECTOMY    Medications prior to admission:   Prior to Admission medications    Medication Sig Start Date End Date Taking? Authorizing Provider   albuterol (PROVENTIL) (2.5 MG/3ML) 0.083% nebulizer solution Take 2.5 mg by nebulization every 6 hours as needed for Wheezing    Historical Provider, MD   ARIPiprazole (ABILIFY) 10 MG tablet Take 10 mg by mouth nightly  22   Historical Provider, MD   hydrOXYzine (VISTARIL) 25 MG capsule TAKE 1 CAPSULE (25 MG) BY MOUTH AT BEDTIME AS NEEDED 22   Historical Provider, MD   ondansetron (ZOFRAN) 4 MG tablet TAKE ONE TABLET BY MOUTH THREE TIMES A DAY AS NEEDED FOR NAUSEA 3/11/22   Historical Provider, MD       Current medications:    Current Facility-Administered Medications   Medication Dose Route Frequency Provider Last Rate Last Admin    ceFAZolin (ANCEF) 2,000 mg in sterile water 20 mL IV syringe  2,000 mg IntraVENous Once Juno Manuel MD           Allergies: Allergies   Allergen Reactions    Bee Venom Rash     swelling       Problem List:    Patient Active Problem List   Diagnosis Code    Echogenic focus of bowel of fetus affecting antepartum care of mother O35. 8XX0    High-risk pregnancy O09.90    Pelvic pressure in pregnancy, antepartum O26.899, R10.2    Abdominal pain affecting pregnancy, antepartum O26.899, R10.9       Past Medical History:        Diagnosis Date    Anxiety     Asthma     Bipolar 1 disorder (HCC)     Depression     Neuropathy     age 14  presently also  BLE    PCOS (polycystic ovarian syndrome)        Past Surgical History:        Procedure Laterality Date    COLONOSCOPY      NERVE SURGERY      bilateral in legs, most recent 5 yrs ago. Has had 5 surgeries total    TUBAL LIGATION Bilateral 11/15/2017       Social History:    Social History     Tobacco Use    Smoking status: Never    Smokeless tobacco: Never   Substance Use Topics    Alcohol use: Yes     Comment: social                                Counseling given: Not Answered      Vital Signs (Current):   Vitals:    07/08/22 0837 07/19/22 0605   BP:  115/60   Pulse:  76   Resp:  16   Temp:  36.5 °C (97.7 °F)   TempSrc:  Temporal   SpO2:  96%   Weight: 210 lb (95.3 kg)    Height: 5' 4\" (1.626 m)                                               BP Readings from Last 3 Encounters:   07/19/22 115/60   07/13/22 122/82   05/03/22 130/81       NPO Status: Time of last liquid consumption: 1900                        Time of last solid consumption: 1900                        Date of last liquid consumption: 07/18/22                        Date of last solid food consumption: 07/18/22    BMI:   Wt Readings from Last 3 Encounters:   07/08/22 210 lb (95.3 kg)   07/13/22 217 lb (98.4 kg)   05/03/22 211 lb (95.7 kg)     Body mass index is 36.05 kg/m².     CBC:   Lab Results   Component Value Date/Time    WBC 6.0 07/19/2022 05:50 AM    RBC 4.96 07/19/2022 05:50 AM    HGB 13.7 07/19/2022 05:50 AM    HCT 41.1 07/19/2022 05:50 AM    MCV 82.9 07/19/2022 05:50 AM    RDW 13.5 07/19/2022 05:50 AM     07/19/2022 05:50 AM       CMP:   Lab Results   Component Value Date/Time     03/28/2016 04:20 PM    K 3.6 03/28/2016 04:20 PM     03/28/2016 04:20 PM    CO2 20 03/28/2016 04:20 PM    BUN 6 03/28/2016 04:20 PM    CREATININE 0.5 03/28/2016 04:20 PM    GFRAA >60 03/28/2016 04:20 PM    LABGLOM >60 03/28/2016 04:20 PM    GLUCOSE 73 07/12/2018 10:22 AM    PROT 6.2 03/28/2016 04:20 PM    CALCIUM 8.6 03/28/2016 04:20 PM    BILITOT 0.3 03/28/2016 04:20 PM    ALKPHOS 211 03/28/2016 04:20 PM    AST 20 03/28/2016 04:20 PM    ALT 13 03/28/2016 04:20 PM       POC Tests: No results for input(s): POCGLU, POCNA, POCK, POCCL, POCBUN, POCHEMO, POCHCT in the last 72 hours. Coags: No results found for: PROTIME, INR, APTT    HCG (If Applicable):   Lab Results   Component Value Date    PREGTESTUR Negative 05/03/2022        ABGs: No results found for: PHART, PO2ART, KHA0QRT, OEL8QFF, BEART, N9AYYGIX     Type & Screen (If Applicable):  No results found for: LABABO, LABRH    Drug/Infectious Status (If Applicable):  No results found for: HIV, HEPCAB    COVID-19 Screening (If Applicable): No results found for: COVID19        Anesthesia Evaluation  Patient summary reviewed and Nursing notes reviewed no history of anesthetic complications:   Airway: Mallampati: III  TM distance: <3 FB   Neck ROM: full  Mouth opening: > = 3 FB   Dental:      Comment: Pt denies loose missing chipped or cracked teeth, no expensive dental work     Pulmonary:normal exam  breath sounds clear to auscultation  (+) asthma: exercise-induced asthma,     (-) not a current smoker          Patient did not smoke on day of surgery. Cardiovascular:Negative CV ROS            Rhythm: regular  Rate: normal           Beta Blocker:  Not on Beta Blocker         Neuro/Psych:   (+) neuromuscular disease ( multiple nerve surgeries. BLE neuropathy ):, psychiatric history ( bipolar ): stable with treatmentdepression/anxiety             GI/Hepatic/Renal: Neg GI/Hepatic/Renal ROS            Endo/Other:                      ROS comment: PCOS  Abdominal:   (+) obese,           Vascular: negative vascular ROS. Other Findings:           Anesthesia Plan      general     ASA 2       Induction: intravenous. BIS  MIPS: Postoperative opioids intended and Prophylactic antiemetics administered. Anesthetic plan and risks discussed with patient. Use of blood products discussed with patient whom consented to blood products. Plan discussed with CRNA and attending.                     Sung Milligan RN   7/19/2022    Pt seen, examined, chart reviewed, plan discussed. Asuncion Haro MD  7/19/2022  6:50 AM    Patient seen; chart reviewed and agree with above.     Twanna Leyden, APRN - CRNA

## 2022-07-19 NOTE — PROGRESS NOTES
Present with Dr. [address] to assist with port placement, uterine artery ligation and cuff closure.      Phuc Hay MD

## 2022-07-19 NOTE — OP NOTE
PATIENT NAME: Tyrel Davis   : 1995  ADMIT DATE: 2022  PROVIDER: Jayme Payan DO     DATE OF PROCEDURE: 2022     PREOPERATIVE DIAGNOSES:    Abnormal uterine bleeding  Dyspareunia  Chronic pelvic pain     POSTOPERATIVE DIAGNOSES:    Abnormal uterine bleeding  Dyspareunia  Chronic pelvic pain  Suspected adenomyosis  Stage I endometriosis     PROCEDURE PERFORMED:  Total laparoscopic hysterectomy and bilateral  salpingectomy. SURGEON: Jayme Payan DO     ASSISTANT:  MD Dr. Nilesh Silverman was present for port placement, tissue management, uterine ligation, and cuff closure     ANESTHESIA:  General.     ESTIMATED BLOOD LOSS:  971 mL     COMPLICATIONS:  None. SPECIMENS: Uterus, cervix, bilateral fallopian tubes    FINDINGS: The patient is a 29 yo  female with normal age specific female secondary sex characteristics and escutcheon. Vaginal mucosa and cervix were of normal morphology. Laparoscopic examination revealed a boggy uterus, approximately 10 weeks size. This is consistent with possible adenomyosis. The bilateral fallopian tubes and ovaries were of normal morphology. There was adhesions of the bilateral fallopian tubes to the pelvic side walls. 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 The remainder of the abdomen and pelvis were without visible pathology. DESCRIPTION OF PROCEDURE:  The patient was taken to the operating room where general anesthesia was found to be adequate, placed in the dorsal lithotomy position. Abdomen, perineum, and vagina were prepped and draped in the normal sterile fashion. Nowak catheter was placed. A YNUG uterine manipulator was placed. After changing my gloves, a 5 mm  incision was made at the umbilicus and a 5 mm trocar was placed under direct visualization. Abdomen was insufflated with CO2 gas. A 12 mm left and 5 mm right lower quadrant ports were placed.   A thorough laparoscopic examination then ensued with the above noted findings. Both tubes were excised with a Harmonic scalpel. The utero-ovarian round ligaments and broad ligaments were cauterized and cut with a Harmonic scalpel. Bladder flap was created with Harmonic dissection. Both uterine arteries were skeletonized, cauterized, and cut with Harmonic scalpel. Anterior and posterior colpotomies were made with the Harmonic scalpel and the cardinal and uterosacral ligaments were also transected with a Harmonic scalpel. Uterus was pulled through the vagina. The vaginal cuff was secured with a V-Loc suture. Bleeding at the right side of the cuff was cauterized with the Harmonic. Good hemostasis was note with addition of Quintin. Peristalsis of the bilateral ureters were noted. Left lower quadrant port was closed with a Anoop-[place] closure device with an 0 Vicryl suture. Pelvis was inspected under low pressure and noted to be hemostatic. Ports were removed. CO2 gas was allowed to escape. Incision was closed with interrupted 4-0 Vicryl suture and dermabond skin glue. The patient was taken to the recovery room in stable condition.              Electronically signed by Brannon Flowers DO on 7/19/2022 at 9:49 AM

## 2022-07-19 NOTE — H&P
Department of Gynecology   History and Physical            HISTORY OF PRESENT ILLNESS:                     The patient is a 28 y.o. Amber Galla with long history of irregular menses and dyspareunia. Patient has heavy periods every 14-28 days that is complicated by anemia. She has failed medical management with an IUD, several birth control pills, and Depo-provera. She is requesting definitive surgical therapy via hysterectomy. Past Medical History:        Diagnosis Date    Anxiety     Asthma     Bipolar 1 disorder ([address]     Depression     Neuropathy     age 14  presently also  BLE    PCOS (polycystic ovarian syndrome)      Past Surgical History:        Procedure Laterality Date    COLONOSCOPY      NERVE SURGERY      bilateral in legs, most recent 5 yrs ago.  Has had 5 surgeries total    TUBAL LIGATION Bilateral 11/15/2017         OB History    Para Term  AB Living   2 2 2     2   SAB IAB Ectopic Molar Multiple Live Births             2      # Outcome Date GA Lbr Zeke/2nd Weight Sex Delivery Anes PTL Lv   2 Term 17 39w0d  7 lb 14.3 oz (3.58 kg) F Vag-Spont EPI N JULIO   1 Term 16 39w1d   F Vag-Spont EPI N        Medications Prior to Admission:   Medications Prior to Admission: albuterol (PROVENTIL) (2.5 MG/3ML) 0.083% nebulizer solution, Take 2.5 mg by nebulization every 6 hours as needed for Wheezing  ARIPiprazole (ABILIFY) 10 MG tablet, Take 10 mg by mouth nightly   hydrOXYzine (VISTARIL) 25 MG capsule, TAKE 1 CAPSULE (25 MG) BY MOUTH AT BEDTIME AS NEEDED  ondansetron (ZOFRAN) 4 MG tablet, TAKE ONE TABLET BY MOUTH THREE TIMES A DAY AS NEEDED FOR NAUSEA    Allergies:  Bee venom     Social History:  Social History     Socioeconomic History    Marital status: Single     Spouse name: None    Number of children: None    Years of education: None    Highest education level: None   Tobacco Use    Smoking status: Never    Smokeless tobacco: Never   Vaping Use    Vaping Use: Never used   Substance and Sexual Activity    Alcohol use: Yes     Comment: social    Drug use: No    Sexual activity: Yes     Partners: Male     Birth control/protection: Surgical       Family History:       Problem Relation Age of Onset    Cancer Mother         cervical cancer    Hypertension Father     Heart Disease Father     Lung Cancer Father     Breast Cancer Maternal Great Grandmother        REVIEW OF SYSTEMS:      Constitutional:  negative  Gastrointestinal:  negative  Genitourinary:  negative  Integument/breast:  negative  Hematologic/lymphatic:  negative  Endocrine:  negative  Behavior/Psych:  negative    PHYSICAL EXAM:    Vitals:  /60   Pulse 76   Temp 97.7 °F (36.5 °C) (Temporal)   Resp 16   Ht 5' 4\" (1.626 m)   Wt 210 lb (95.3 kg)   LMP 2022   SpO2 96%   BMI 36.05 kg/m²     Gen A/O x3  Heart RRR  Lungs Clear   Abd Soft, NT, ND, +BS  Ext No edema  Pelvic Will examine in OR    DATA:      ASSESSMENT AND PLAN:      29 yo  with AUB and dyspareunia  To OR for total laparoscopic hysterectomy, bilateral salpingectomy. R/B/A explained and informed consent obtained.      Daiana Ramos DO 2022 7:22 AM

## 2022-07-20 VITALS
HEIGHT: 64 IN | OXYGEN SATURATION: 100 % | SYSTOLIC BLOOD PRESSURE: 111 MMHG | RESPIRATION RATE: 18 BRPM | TEMPERATURE: 98 F | WEIGHT: 222 LBS | BODY MASS INDEX: 37.9 KG/M2 | HEART RATE: 65 BPM | DIASTOLIC BLOOD PRESSURE: 56 MMHG

## 2022-07-20 LAB
HCT VFR BLD CALC: 36.2 % (ref 34–48)
HEMOGLOBIN: 12.1 G/DL (ref 11.5–15.5)

## 2022-07-20 PROCEDURE — 6370000000 HC RX 637 (ALT 250 FOR IP): Performed by: OBSTETRICS & GYNECOLOGY

## 2022-07-20 PROCEDURE — 85014 HEMATOCRIT: CPT

## 2022-07-20 PROCEDURE — 6360000002 HC RX W HCPCS: Performed by: OBSTETRICS & GYNECOLOGY

## 2022-07-20 PROCEDURE — 36415 COLL VENOUS BLD VENIPUNCTURE: CPT

## 2022-07-20 PROCEDURE — 85018 HEMOGLOBIN: CPT

## 2022-07-20 PROCEDURE — 2580000003 HC RX 258: Performed by: OBSTETRICS & GYNECOLOGY

## 2022-07-20 PROCEDURE — G0378 HOSPITAL OBSERVATION PER HR: HCPCS

## 2022-07-20 RX ORDER — HYDROCODONE BITARTRATE AND ACETAMINOPHEN 5; 325 MG/1; MG/1
2 TABLET ORAL EVERY 4 HOURS PRN
Qty: 20 TABLET | Refills: 0 | Status: SHIPPED | OUTPATIENT
Start: 2022-07-20 | End: 2022-07-23

## 2022-07-20 RX ORDER — IBUPROFEN 800 MG/1
800 TABLET ORAL EVERY 8 HOURS
Qty: 120 TABLET | Refills: 3 | Status: SHIPPED | OUTPATIENT
Start: 2022-07-20

## 2022-07-20 RX ADMIN — IBUPROFEN 800 MG: 800 TABLET, FILM COATED ORAL at 11:36

## 2022-07-20 RX ADMIN — HYDROCODONE BITARTRATE AND ACETAMINOPHEN 2 TABLET: 5; 325 TABLET ORAL at 03:06

## 2022-07-20 RX ADMIN — KETOROLAC TROMETHAMINE 30 MG: 30 INJECTION, SOLUTION INTRAMUSCULAR; INTRAVENOUS at 05:27

## 2022-07-20 RX ADMIN — HYDROCODONE BITARTRATE AND ACETAMINOPHEN 2 TABLET: 5; 325 TABLET ORAL at 09:22

## 2022-07-20 RX ADMIN — SODIUM CHLORIDE, PRESERVATIVE FREE 10 ML: 5 INJECTION INTRAVENOUS at 09:22

## 2022-07-20 RX ADMIN — DOCUSATE SODIUM 100 MG: 100 CAPSULE, LIQUID FILLED ORAL at 09:23

## 2022-07-20 ASSESSMENT — PAIN DESCRIPTION - ORIENTATION
ORIENTATION: LEFT
ORIENTATION: LEFT;LOWER
ORIENTATION: LEFT;MID;LOWER
ORIENTATION: LEFT

## 2022-07-20 ASSESSMENT — PAIN DESCRIPTION - DESCRIPTORS
DESCRIPTORS: DISCOMFORT;ACHING
DESCRIPTORS: ACHING
DESCRIPTORS: ACHING
DESCRIPTORS: THROBBING;ACHING;DISCOMFORT

## 2022-07-20 ASSESSMENT — PAIN DESCRIPTION - LOCATION
LOCATION: ABDOMEN
LOCATION: ABDOMEN;PELVIS
LOCATION: ABDOMEN;PELVIS
LOCATION: ABDOMEN

## 2022-07-20 ASSESSMENT — PAIN - FUNCTIONAL ASSESSMENT
PAIN_FUNCTIONAL_ASSESSMENT: ACTIVITIES ARE NOT PREVENTED
PAIN_FUNCTIONAL_ASSESSMENT: ACTIVITIES ARE NOT PREVENTED

## 2022-07-20 ASSESSMENT — PAIN SCALES - GENERAL
PAINLEVEL_OUTOF10: 7
PAINLEVEL_OUTOF10: 4
PAINLEVEL_OUTOF10: 6
PAINLEVEL_OUTOF10: 9

## 2022-07-20 NOTE — DISCHARGE SUMMARY
Physician Discharge Summary     Patient ID:  Uma Beaver  [id number]  89 y.o.  1995    Admit date: 7/19/2022    Discharge date and time:      Admitting Physician: Marcial Haley DO    Discharge Diagnoses: Abnormal uterine bleeding [N93.9]  S/P hysterectomy [Z90.710]    Discharged Condition: stable    Indication for Admission: The patient is a 28 y.o. Sherley Hernandeze with long history of irregular menses and dyspareunia. Patient has heavy periods every 14-28 days that is complicated by anemia. She has failed medical management with an IUD, several birth control pills, and Depo-provera. She is requesting definitive surgical therapy via hysterectomy. Procedures Performed: [facility] w/    Hospital Course: Patient was admitted on the day of surgery, and underwent an uncomplicated procedure. Discharge Exam:  Physical Exam  Abd soft, some distention, bowel sounds active, no abnormal masses, appropriate post-surgical TTP  Inc clean, dry, intact  extremities normal, atraumatic, no cyanosis or edema and Homans sign is negative, no sign of DVT     Disposition: home    Patient Instructions: Activity: no heavy lifting for 6 weeks  Diet: regular diet  Wound Care: keep wound clean and dry    Discharge Medication:      Medication List        START taking these medications      HYDROcodone-acetaminophen 5-325 MG per tablet  Commonly known as: NORCO  Take 2 tablets by mouth every 4 hours as needed for Pain for up to 3 days. ibuprofen 800 MG tablet  Commonly known as: ADVIL;MOTRIN  Take 1 tablet by mouth in the morning and 1 tablet at noon and 1 tablet in the evening.             CONTINUE taking these medications      albuterol (2.5 MG/3ML) 0.083% nebulizer solution  Commonly known as: PROVENTIL     ARIPiprazole 10 MG tablet  Commonly known as: ABILIFY     hydrOXYzine pamoate 25 MG capsule  Commonly known as: VISTARIL     ondansetron 4 MG tablet  Commonly known as: Aneta Carson               Where to Get Your Medications        These medications were sent to [facility], [facility] [address] [phone]  [address] [facility] [place] [address]      Phone: [phone]   HYDROcodone-acetaminophen 5-325 MG per tablet  ibuprofen 800 MG tablet          Follow-up with Rosalie Martinez DO in 1 week.     Signed:  Rosalie Martinez DO  7/20/2022  12:13 PM

## 2022-07-20 NOTE — PROGRESS NOTES
[facility] Quality Flow/Interdisciplinary Rounds Progress Note        Quality Flow Rounds held on July 20, 2022    Disciplines Attending:  Bedside Nurse, , , and Nursing Unit Leadership    Karen Gupta was admitted on 7/19/2022  5:30 AM    Anticipated Discharge Date:       Disposition:    Moise Score:  Moise Scale Score: 22    Readmission Risk              Risk of Unplanned Readmission:  0           Discussed patient goal for the day, patient clinical progression, and barriers to discharge.   The following Goal(s) of the Day/Commitment(s) have been identified:  Diagnostics - Report Results and Labs - Report Results, discharge planning      Blayne Ureña RN  July 20, 2022

## 2022-07-20 NOTE — ANESTHESIA POSTPROCEDURE EVALUATION
Department of Anesthesiology  Postprocedure Note    Patient: Santosh Mendoza  MRN: [id number]  YOB: 1995  Date of evaluation: 7/19/2022      Procedure Summary     Date: 07/19/22 Room / Location: [place] [address] / [facility]    Anesthesia Start: 2339 Anesthesia Stop: 3810    Procedure: LAPAROSCOPIC HYSTERECTOMY WITH BILATERAL SALPINGECTOMY (Abdomen) Diagnosis: (ABNORMAL UTERINE BLEEDING AND PELVIC PAIN)    Surgeons: Marquita Murphy MD Responsible Provider: Velasquez Jenkins MD    Anesthesia Type: general ASA Status: 2          Anesthesia Type: No value filed.     Edgar Phase I: Edgar Score: 8    Edgar Phase II:        Anesthesia Post Evaluation    Patient location during evaluation: PACU  Patient participation: complete - patient participated  Level of consciousness: awake  Airway patency: patent  Nausea & Vomiting: no nausea and no vomiting  Complications: no  Cardiovascular status: hemodynamically stable  Respiratory status: acceptable  Hydration status: stable

## 2022-07-20 NOTE — PROGRESS NOTES
Progress Note    SUBJECTIVE:  Pt had some nausea, this has resolved. No vaginal bleeding. Tolerating regular diet. Voiding and ambulating without difficulty. Pt has showered. Pain is controlled. OBJECTIVE:    VITALS:  BP (!) 111/56   Pulse 65   Temp 98 °F (36.7 °C) (Oral)   Resp 18   Ht 5' 4\" (1.626 m)   Wt 222 lb (100.7 kg)   LMP 04/04/2022   SpO2 100%   BMI 38.11 kg/m²   Physical Exam  Abd soft, some distention, bowel sounds active, no abnormal masses, appropriate post-surgical TTP  Inc clean, dry, intact  extremities normal, atraumatic, no cyanosis or edema and Homans sign is negative, no sign of DVT      DATA:  Hemoglobin/Hematocrit:    Lab Results   Component Value Date/Time    HGB 12.1 07/20/2022 03:02 AM    HCT 36.2 07/20/2022 03:02 AM       No results found. ASSESSMENT AND PLAN: POD# 1 S/P TLH with bilateral salpingectomy  Doing well postoperatively. Encouraged ambulation. Discharge home with f/u in office in 1 week.        Rosalie Martinez DO 7/20/2022 10:58 AM

## 2022-07-21 NOTE — PROGRESS NOTES
CLINICAL PHARMACY NOTE: MEDS TO BEDS    Total # of Prescriptions Filled: 2   The following medications were delivered to the patient:  Ibuprofen 800 mg  Norco 5-325 mg    Additional Documentation:

## 2022-07-28 ENCOUNTER — OFFICE VISIT (OUTPATIENT)
Dept: OBGYN | Age: 27
End: 2022-07-28
Payer: MEDICAID

## 2022-07-28 VITALS
SYSTOLIC BLOOD PRESSURE: 124 MMHG | DIASTOLIC BLOOD PRESSURE: 82 MMHG | WEIGHT: 215.7 LBS | HEART RATE: 95 BPM | BODY MASS INDEX: 37.02 KG/M2

## 2022-07-28 DIAGNOSIS — Z98.890 POSTOPERATIVE STATE: Primary | ICD-10-CM

## 2022-07-28 PROCEDURE — 99213 OFFICE O/P EST LOW 20 MIN: CPT | Performed by: OBSTETRICS & GYNECOLOGY

## 2022-07-28 PROCEDURE — 99024 POSTOP FOLLOW-UP VISIT: CPT | Performed by: OBSTETRICS & GYNECOLOGY

## 2022-07-28 NOTE — PROGRESS NOTES
HISTORY OF PRESENT ILLNESS:    28 y.o. female  here for post op appointment. Pt c/o left side pain. She is taking motrin for pain. Denies vaginal bleeding. Tolerating regular diet. Denies gastrointestinal or urinary complaints. Diagnosis:   Abnormal uterine bleeding  Dyspareunia  Chronic pelvic pain  Suspected adenomyosis  Stage I endometriosis  Surgery: Total laparoscopic hysterectomy and bilateral  salpingectomy. Findings: The patient is a 29 yo  female with normal age specific female secondary sex characteristics and escutcheon. Vaginal mucosa and cervix were of normal morphology. Laparoscopic examination revealed a boggy uterus, approximately 10 weeks size. This is consistent with possible adenomyosis. The bilateral fallopian tubes and ovaries were of normal morphology. There was adhesions of the bilateral fallopian tubes to the pelvic side walls. 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 The remainder of the abdomen and pelvis were without visible pathology. Complications: None      Past Medical History:   Past Medical History:   Diagnosis Date    Anxiety     Asthma     Bipolar 1 disorder ([facility] [address]     Depression     Neuropathy     age 14  presently also  BLE    PCOS (polycystic ovarian syndrome)                                              OB History    Para Term  AB Living   2 2 2     2   SAB IAB Ectopic Molar Multiple Live Births             2      # Outcome Date GA Lbr Zeke/2nd Weight Sex Delivery Anes PTL Lv   2 Term 17 39w0d  7 lb 14.3 oz (3.58 kg) F Vag-Spont EPI N JULIO   1 Term 16 39w1d   F Vag-Spont EPI N           Past Surgical History:   Past Surgical History:   Procedure Laterality Date    COLONOSCOPY      HYSTERECTOMY (CERVIX STATUS UNKNOWN) N/A 2022    LAPAROSCOPIC HYSTERECTOMY WITH BILATERAL SALPINGECTOMY performed by Panda Win MD at [facility]      bilateral in legs, most recent 5 yrs ago.  Has had 5 surgeries total    TUBAL LIGATION Bilateral 11/15/2017        Allergies: Bee venom     Medications:   Current Outpatient Medications   Medication Sig Dispense Refill    ibuprofen (ADVIL;MOTRIN) 800 MG tablet Take 1 tablet by mouth in the morning and 1 tablet at noon and 1 tablet in the evening. 120 tablet 3    albuterol (PROVENTIL) (2.5 MG/3ML) 0.083% nebulizer solution Take 2.5 mg by nebulization every 6 hours as needed for Wheezing      ARIPiprazole (ABILIFY) 10 MG tablet Take 10 mg by mouth nightly       hydrOXYzine (VISTARIL) 25 MG capsule TAKE 1 CAPSULE (25 MG) BY MOUTH AT BEDTIME AS NEEDED      ondansetron (ZOFRAN) 4 MG tablet TAKE ONE TABLET BY MOUTH THREE TIMES A DAY AS NEEDED FOR NAUSEA       No current facility-administered medications for this visit. Social History:   Social History     Tobacco Use    Smoking status: Never    Smokeless tobacco: Never   Substance Use Topics    Alcohol use: Yes     Comment: social        Family History:   Family History   Problem Relation Age of Onset    Cancer Mother         cervical cancer    Hypertension Father     Heart Disease Father     Lung Cancer Father     Breast Cancer Maternal Great Grandmother        REVIEW OF SYSTEMS:    Constitutional: negative  HEENT: negative  Breast: negative  Respiratory: negative  Cardiovascular: negative  Gastrointestinal: negative  Genitourinary:negative  Integument: negative  Neurological: negative  Endocrine: negative          PHYSICAL EXAM  /82 (Position: Sitting)   Pulse 95   Wt 215 lb 11.2 oz (97.8 kg)   BMI 37.02 kg/m²       General appearance: alert, cooperative and in no acute distress. Head: NCAT   Abdomen: soft, appropriate postsurgical TTP, nondistended, no masses palpable, no rebound tenderness, no guarding or rigidity  Incision: C/D/I  Neurologic: Alert and oriented, no focal deficits  Psych: Alert, oriented, mood/affect full range, no acute distress        ASSESSMENT :    Diagnosis Orders   1. Postoperative state             PLAN:    Postop instructions given. Reviewed findings of surgery. All questions answered. Add tylenol in for pain. 1000mg q 6 hrs. Return in about 1 week (around 8/4/2022) for Postop. No orders of the defined types were placed in this encounter. No orders of the defined types were placed in this encounter.         Electronically signed by Bladimir Frank DO on 7/28/22

## 2022-07-28 NOTE — PROGRESS NOTES
Here today for post op visit from hysterectomy on 7/19. Still having some discomfort. Incisions are clean and dry. Discharge instructions given by physician and patient verbalized understanding.

## 2022-08-04 ENCOUNTER — OFFICE VISIT (OUTPATIENT)
Dept: OBGYN | Age: 27
End: 2022-08-04
Payer: MEDICAID

## 2022-08-04 VITALS
HEART RATE: 91 BPM | BODY MASS INDEX: 36.49 KG/M2 | WEIGHT: 212.6 LBS | DIASTOLIC BLOOD PRESSURE: 79 MMHG | SYSTOLIC BLOOD PRESSURE: 117 MMHG

## 2022-08-04 DIAGNOSIS — Z98.890 POSTOPERATIVE STATE: Primary | ICD-10-CM

## 2022-08-04 PROCEDURE — 99024 POSTOP FOLLOW-UP VISIT: CPT | Performed by: OBSTETRICS & GYNECOLOGY

## 2022-08-04 PROCEDURE — 99213 OFFICE O/P EST LOW 20 MIN: CPT | Performed by: OBSTETRICS & GYNECOLOGY

## 2022-08-04 NOTE — PROGRESS NOTES
HISTORY OF PRESENT ILLNESS:    28 y.o. female  here for post op appointment. Pt reports improvement of pain with motrin and tylenol alternating. Denies vaginal bleeding. Tolerating regular diet. Denies gastrointestinal or urinary complaints. Diagnosis:   Abnormal uterine bleeding  Dyspareunia  Chronic pelvic pain  Suspected adenomyosis  Stage I endometriosis  Surgery: Total laparoscopic hysterectomy and bilateral  salpingectomy. Findings: The patient is a 27 yo  female with normal age specific female secondary sex characteristics and escutcheon. Vaginal mucosa and cervix were of normal morphology. Laparoscopic examination revealed a boggy uterus, approximately 10 weeks size. This is consistent with possible adenomyosis. The bilateral fallopian tubes and ovaries were of normal morphology. There was adhesions of the bilateral fallopian tubes to the pelvic side walls. 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 The remainder of the abdomen and pelvis were without visible pathology. Complications: None      Past Medical History:   Past Medical History:   Diagnosis Date    Anxiety     Asthma     Bipolar 1 disorder ([facility] [address]     Depression     Neuropathy     age 14  presently also  BLE    PCOS (polycystic ovarian syndrome)                                              OB History    Para Term  AB Living   2 2 2     2   SAB IAB Ectopic Molar Multiple Live Births             2      # Outcome Date GA Lbr Zeke/2nd Weight Sex Delivery Anes PTL Lv   2 Term 17 39w0d  7 lb 14.3 oz (3.58 kg) F Vag-Spont EPI N JULIO   1 Term 16 39w1d   F Vag-Spont EPI N           Past Surgical History:   Past Surgical History:   Procedure Laterality Date    COLONOSCOPY      HYSTERECTOMY (CERVIX STATUS UNKNOWN) N/A 2022    LAPAROSCOPIC HYSTERECTOMY WITH BILATERAL SALPINGECTOMY performed by Ciara Albert MD at [facility]      bilateral in legs, most recent 5 yrs ago. Has had 5 surgeries total    TUBAL LIGATION Bilateral 11/15/2017        Allergies: Bee venom     Medications:   Current Outpatient Medications   Medication Sig Dispense Refill    ibuprofen (ADVIL;MOTRIN) 800 MG tablet Take 1 tablet by mouth in the morning and 1 tablet at noon and 1 tablet in the evening. 120 tablet 3    albuterol (PROVENTIL) (2.5 MG/3ML) 0.083% nebulizer solution Take 2.5 mg by nebulization every 6 hours as needed for Wheezing      ARIPiprazole (ABILIFY) 10 MG tablet Take 10 mg by mouth nightly       hydrOXYzine (VISTARIL) 25 MG capsule TAKE 1 CAPSULE (25 MG) BY MOUTH AT BEDTIME AS NEEDED      ondansetron (ZOFRAN) 4 MG tablet TAKE ONE TABLET BY MOUTH THREE TIMES A DAY AS NEEDED FOR NAUSEA       No current facility-administered medications for this visit. Social History:   Social History     Tobacco Use    Smoking status: Never    Smokeless tobacco: Never   Substance Use Topics    Alcohol use: Yes     Comment: social        Family History:   Family History   Problem Relation Age of Onset    Cancer Mother         cervical cancer    Hypertension Father     Heart Disease Father     Lung Cancer Father     Breast Cancer Maternal Great Grandmother        REVIEW OF SYSTEMS:    Constitutional: negative  HEENT: negative  Breast: negative  Respiratory: negative  Cardiovascular: negative  Gastrointestinal: negative  Genitourinary:negative  Integument: negative  Neurological: negative  Endocrine: negative          PHYSICAL EXAM  /79 (Position: Sitting)   Pulse 91   Wt 212 lb 9.6 oz (96.4 kg)   BMI 36.49 kg/m²       General appearance: alert, cooperative and in no acute distress. Head: NCAT   Abdomen: soft, appropriate postsurgical TTP, nondistended, no masses palpable, no rebound tenderness, no guarding or rigidity  Incision: C/D/I  Neurologic: Alert and oriented, no focal deficits  Psych: Alert, oriented, mood/affect full range, no acute distress        ASSESSMENT :    Diagnosis Orders   1. Postoperative state               PLAN:    Postop instructions given. Reviewed findings of surgery. All questions answered. Return in about 3 weeks (around 8/25/2022) for postop visit. No orders of the defined types were placed in this encounter. No orders of the defined types were placed in this encounter.         Electronically signed by Marcial Haley DO on 7/28/22

## 2022-08-04 NOTE — PROGRESS NOTES
Here today for recheck post op appointment. Pain improve. Sites are clear and dry x3. Discharge instructions have been discussed with the patient. Patient advised to call our office with any questions or concerns. Voiced understanding.

## 2022-09-05 ENCOUNTER — PATIENT MESSAGE (OUTPATIENT)
Dept: OBGYN | Age: 27
End: 2022-09-05

## 2022-09-07 NOTE — TELEPHONE ENCOUNTER
From: Carla Luu  To: Dr. Amie Davidson: 9/5/2022 12:34 PM EDT  Subject: Appointment Thursday. Good morning,     Hi I was just wondering if there was a way that we could do a phone visit for Thursday or if you needed to absolutely see me. I dont have a way up there this week, and I can really keep pushing the appointment because Id like to get back to work.      Thank you,   Jose Pimentel

## 2022-09-12 ENCOUNTER — TELEPHONE (OUTPATIENT)
Dept: ADMINISTRATIVE | Age: 27
End: 2022-09-12

## 2022-09-12 NOTE — TELEPHONE ENCOUNTER
Patient called to reschedule her post op with Dr Maribel Sanders. Please call and schedule. Thank you!

## 2022-09-16 ENCOUNTER — TELEPHONE (OUTPATIENT)
Dept: ADMINISTRATIVE | Age: 27
End: 2022-09-16

## 2022-09-16 NOTE — TELEPHONE ENCOUNTER
Patient called to reschedule her missed 6 week post op with Dr Maye Ceja. Please call patient to reschedule. THank you!

## 2022-09-29 ENCOUNTER — OFFICE VISIT (OUTPATIENT)
Dept: OBGYN | Age: 27
End: 2022-09-29
Payer: MEDICAID

## 2022-09-29 VITALS
SYSTOLIC BLOOD PRESSURE: 128 MMHG | WEIGHT: 216.7 LBS | HEART RATE: 97 BPM | BODY MASS INDEX: 37.2 KG/M2 | DIASTOLIC BLOOD PRESSURE: 81 MMHG

## 2022-09-29 DIAGNOSIS — Z98.890 POSTOPERATIVE STATE: Primary | ICD-10-CM

## 2022-09-29 PROCEDURE — 99024 POSTOP FOLLOW-UP VISIT: CPT | Performed by: OBSTETRICS & GYNECOLOGY

## 2022-09-29 PROCEDURE — 99213 OFFICE O/P EST LOW 20 MIN: CPT | Performed by: OBSTETRICS & GYNECOLOGY

## 2022-09-29 RX ORDER — OMEPRAZOLE 40 MG/1
CAPSULE, DELAYED RELEASE ORAL
COMMUNITY
Start: 2022-09-14

## 2022-09-29 NOTE — PROGRESS NOTES
Here today for post op 6 week check up after hysterectomy. No complaints. Pelvic exam done, no specimens obtained. Discharge instructions have been discussed with the patient. Patient advised to call our office with any questions or concerns. Voiced understanding.

## 2022-09-29 NOTE — PROGRESS NOTES
HISTORY OF PRESENT ILLNESS:      28 y.o. female  here for post op appointment. Pain is controlled. Denies vaginal bleeding. Tolerating regular diet. Denies gastrointestinal or urinary complaints. Diagnosis:   Abnormal uterine bleeding  Dyspareunia  Chronic pelvic pain  Suspected adenomyosis  Stage I endometriosis  Surgery: Total laparoscopic hysterectomy and bilateral  salpingectomy. Findings: The patient is a 27 yo  female with normal age specific female secondary sex characteristics and escutcheon. Vaginal mucosa and cervix were of normal morphology. Laparoscopic examination revealed a boggy uterus, approximately 10 weeks size. This is consistent with possible adenomyosis. The bilateral fallopian tubes and ovaries were of normal morphology. There was adhesions of the bilateral fallopian tubes to the pelvic side walls. 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 The remainder of the abdomen and pelvis were without visible pathology. Complications: None        Past Medical History:   Past Medical History:   Diagnosis Date    Anxiety     Asthma     Bipolar 1 disorder ([address]     Depression     Neuropathy     age 14  presently also  BLE    PCOS (polycystic ovarian syndrome)                                              OB History    Para Term  AB Living   2 2 2     2   SAB IAB Ectopic Molar Multiple Live Births             2      # Outcome Date GA Lbr Zeke/2nd Weight Sex Delivery Anes PTL Lv   2 Term 17 39w0d  7 lb 14.3 oz (3.58 kg) F Vag-Spont EPI N JULIO   1 Term 16 39w1d   F Vag-Spont EPI N           Past Surgical History:   Past Surgical History:   Procedure Laterality Date    COLONOSCOPY      HYSTERECTOMY (CERVIX STATUS UNKNOWN) N/A 2022    LAPAROSCOPIC HYSTERECTOMY WITH BILATERAL SALPINGECTOMY performed by Ry Friend MD at [facility]      bilateral in legs, most recent 5 yrs ago.  Has had 5 surgeries total    TUBAL LIGATION Bilateral 11/15/2017        Allergies: Bee venom     Medications:   Current Outpatient Medications   Medication Sig Dispense Refill    ibuprofen (ADVIL;MOTRIN) 800 MG tablet Take 1 tablet by mouth in the morning and 1 tablet at noon and 1 tablet in the evening. 120 tablet 3    albuterol (PROVENTIL) (2.5 MG/3ML) 0.083% nebulizer solution Take 2.5 mg by nebulization every 6 hours as needed for Wheezing      ARIPiprazole (ABILIFY) 10 MG tablet Take 10 mg by mouth nightly       hydrOXYzine (VISTARIL) 25 MG capsule TAKE 1 CAPSULE (25 MG) BY MOUTH AT BEDTIME AS NEEDED      ondansetron (ZOFRAN) 4 MG tablet TAKE ONE TABLET BY MOUTH THREE TIMES A DAY AS NEEDED FOR NAUSEA      omeprazole (PRILOSEC) 40 MG delayed release capsule        No current facility-administered medications for this visit. Social History:   Social History     Tobacco Use    Smoking status: Never    Smokeless tobacco: Never   Substance Use Topics    Alcohol use: Yes     Comment: social        Family History:   Family History   Problem Relation Age of Onset    Cancer Mother         cervical cancer    Hypertension Father     Heart Disease Father     Lung Cancer Father     Breast Cancer Maternal Great Grandmother        REVIEW OF SYSTEMS:    Constitutional: negative  HEENT: negative  Breast: negative  Respiratory: negative  Cardiovascular: negative  Gastrointestinal: negative  Genitourinary:negative  Integument: negative  Neurological: negative  Endocrine: negative          PHYSICAL EXAM  /81 (Position: Sitting)   Pulse 97   Wt 216 lb 11.2 oz (98.3 kg)   BMI 37.20 kg/m²       General appearance: alert, cooperative and in no acute distress.   Head: NCAT   Abdomen: soft, nontender, nondistended, no masses palpable, no rebound tenderness, no guarding or rigidity  Incision: C/D/I  Neurologic: Alert and oriented, no focal deficits  Psych: Alert, oriented, mood/affect full range, no acute distress    Pelvic Exam:   EXTERNAL GENITALIA: normal external genitalia, no external lesions  VAGINA: vaginal cuff intact        ASSESSMENT :    Diagnosis Orders   1. Postoperative state             PLAN:    Postop instructions given. Reviewed findings of surgery. All questions answered. Return to regular activity. F/u 1 year    Return in about 1 year (around 9/29/2023) for Annual exam.      No orders of the defined types were placed in this encounter. No orders of the defined types were placed in this encounter.         Electronically signed by Yenifer Mars DO on 9/29/22

## 2022-10-03 ENCOUNTER — TELEPHONE (OUTPATIENT)
Dept: OBGYN | Age: 27
End: 2022-10-03

## 2022-10-03 NOTE — TELEPHONE ENCOUNTER
Alonzo Norris needs a letter releasing her to return to work. She had a hysterectomy 8 or 9 weeks ago. Letter needs faxed to the [facility] - [phone].

## 2023-12-28 ENCOUNTER — OFFICE VISIT (OUTPATIENT)
Dept: OBGYN | Age: 28
End: 2023-12-28
Payer: MEDICAID

## 2023-12-28 VITALS
HEART RATE: 75 BPM | HEIGHT: 65 IN | WEIGHT: 214.5 LBS | DIASTOLIC BLOOD PRESSURE: 82 MMHG | BODY MASS INDEX: 35.74 KG/M2 | SYSTOLIC BLOOD PRESSURE: 129 MMHG

## 2023-12-28 DIAGNOSIS — E28.2 PCOS (POLYCYSTIC OVARIAN SYNDROME): ICD-10-CM

## 2023-12-28 DIAGNOSIS — N64.4 PAIN OF BOTH BREASTS: ICD-10-CM

## 2023-12-28 DIAGNOSIS — Z01.419 ENCOUNTER FOR GYNECOLOGICAL EXAMINATION: Primary | ICD-10-CM

## 2023-12-28 DIAGNOSIS — N94.10 DYSPAREUNIA IN FEMALE: ICD-10-CM

## 2023-12-28 PROCEDURE — 99213 OFFICE O/P EST LOW 20 MIN: CPT | Performed by: OBSTETRICS & GYNECOLOGY

## 2023-12-28 PROCEDURE — G8484 FLU IMMUNIZE NO ADMIN: HCPCS | Performed by: OBSTETRICS & GYNECOLOGY

## 2023-12-28 PROCEDURE — 99385 PREV VISIT NEW AGE 18-39: CPT | Performed by: OBSTETRICS & GYNECOLOGY

## 2023-12-28 RX ORDER — PIOGLITAZONEHYDROCHLORIDE 15 MG/1
15 TABLET ORAL DAILY
COMMUNITY
Start: 2023-11-13

## 2023-12-28 RX ORDER — LUMATEPERONE 42 MG/1
42 CAPSULE ORAL DAILY
COMMUNITY
Start: 2023-12-11

## 2023-12-28 RX ORDER — BUTALBITAL, ACETAMINOPHEN AND CAFFEINE 300; 40; 50 MG/1; MG/1; MG/1
CAPSULE ORAL
COMMUNITY
Start: 2023-09-29

## 2023-12-28 NOTE — PROGRESS NOTES
Patient here for annual. PCP is concerned with testosterone  in system Patient has painful intercourse. Patient brought results from PCP. Testosterone paperwork scanned into chart. Discharge instructions given to patient. Advised patient to call office if any concerns.  Patient voiced understanding

## 2023-12-28 NOTE — PROGRESS NOTES
HISTORY OF PRESENT ILLNESS:    33 y.o. female  Katarzyna Lobe presents for her annual exam.     Patient's last menstrual period was 2022. Periods are: n/a, pt had hysterectomy  Contraception: n/a  Number of new sexual partners: 0  Most recent pap smear:  normal per pt  History of abnormal pap smears: LSIL 2016  Most recent mammogram:   History of abnormal mammogram:   Most recent colonoscopy:   Dexa scan:  normal  HPV vaccination: no  Changes to health since last visit: pt diagnosed with diabetes, currently on actos  Complaints: hyst   Pt admits pain with intercourse still. Pain is lower pelvis, every time. No improvement with position change. Denies being dry. No urinary or GI symptoms. Pt had stage 1 endometriosis. PCP, Dr. Carlitos Diamond, did bloodwork, testosterone is elevated. Liver enzymes are elevated. +HLD. Pt has fatty liver. Pt admits bilateral breast pain, occasional. States it is worse than before.  Pt had US done before that were normal.     Past Medical History:   Past Medical History:   Diagnosis Date    Anxiety     Asthma     Bipolar 1 disorder ([address] [facility])     Depression     Diabetes ([address] )     Endometriosis     Stage 1    Fatty liver     HLD (hyperlipidemia)     LGSIL on Pap smear of cervix 2016    Neuropathy     age 14  presently also  BLE    PCOS (polycystic ovarian syndrome)                                              OB History    Para Term  AB Living   2 2 2     2   SAB IAB Ectopic Molar Multiple Live Births             2      # Outcome Date GA Lbr Zeke/2nd Weight Sex Delivery Anes PTL Lv   2 Term 17 39w0d  3.58 kg (7 lb 14.3 oz) F Vag-Spont EPI N JULIO   1 Term 16 39w1d   F Vag-Spont EPI N           Past Surgical History:   Past Surgical History:   Procedure Laterality Date    COLONOSCOPY      HYSTERECTOMY (CERVIX STATUS UNKNOWN) N/A 2022    LAPAROSCOPIC HYSTERECTOMY WITH BILATERAL SALPINGECTOMY performed by Jose Parrish MD at [address] [place] [address] Dr NORTON

## 2024-01-25 ENCOUNTER — TELEPHONE (OUTPATIENT)
Dept: OBGYN | Age: 29
End: 2024-01-25

## 2024-01-25 NOTE — TELEPHONE ENCOUNTER
Patient called due to lab work faxed over for Dr Ventura. Patient is concerned about insulin level. Advised patient to resent the lab work. Gave patient our fax number. Patient is faxing it today.

## 2024-05-09 ENCOUNTER — OFFICE VISIT (OUTPATIENT)
Dept: OBGYN | Age: 29
End: 2024-05-09
Payer: COMMERCIAL

## 2024-05-09 VITALS
DIASTOLIC BLOOD PRESSURE: 66 MMHG | WEIGHT: 214.9 LBS | BODY MASS INDEX: 35.76 KG/M2 | SYSTOLIC BLOOD PRESSURE: 109 MMHG | HEART RATE: 60 BPM

## 2024-05-09 DIAGNOSIS — E28.2 PCOS (POLYCYSTIC OVARIAN SYNDROME): ICD-10-CM

## 2024-05-09 DIAGNOSIS — G89.29 CHRONIC PELVIC PAIN IN FEMALE: Primary | ICD-10-CM

## 2024-05-09 DIAGNOSIS — R10.2 CHRONIC PELVIC PAIN IN FEMALE: Primary | ICD-10-CM

## 2024-05-09 PROCEDURE — 99213 OFFICE O/P EST LOW 20 MIN: CPT | Performed by: OBSTETRICS & GYNECOLOGY

## 2024-05-09 PROCEDURE — 99212 OFFICE O/P EST SF 10 MIN: CPT | Performed by: OBSTETRICS & GYNECOLOGY

## 2024-05-09 PROCEDURE — G8427 DOCREV CUR MEDS BY ELIG CLIN: HCPCS | Performed by: OBSTETRICS & GYNECOLOGY

## 2024-05-09 PROCEDURE — 1036F TOBACCO NON-USER: CPT | Performed by: OBSTETRICS & GYNECOLOGY

## 2024-05-09 PROCEDURE — G8417 CALC BMI ABV UP PARAM F/U: HCPCS | Performed by: OBSTETRICS & GYNECOLOGY

## 2024-05-09 RX ORDER — ACETAMINOPHEN AND CODEINE PHOSPHATE 120; 12 MG/5ML; MG/5ML
1 SOLUTION ORAL DAILY
Qty: 30 TABLET | Refills: 3 | Status: SHIPPED | OUTPATIENT
Start: 2024-05-09

## 2024-05-09 ASSESSMENT — PATIENT HEALTH QUESTIONNAIRE - PHQ9
3. TROUBLE FALLING OR STAYING ASLEEP: MORE THAN HALF THE DAYS
2. FEELING DOWN, DEPRESSED OR HOPELESS: MORE THAN HALF THE DAYS
5. POOR APPETITE OR OVEREATING: MORE THAN HALF THE DAYS
1. LITTLE INTEREST OR PLEASURE IN DOING THINGS: MORE THAN HALF THE DAYS
8. MOVING OR SPEAKING SO SLOWLY THAT OTHER PEOPLE COULD HAVE NOTICED. OR THE OPPOSITE, BEING SO FIGETY OR RESTLESS THAT YOU HAVE BEEN MOVING AROUND A LOT MORE THAN USUAL: NOT AT ALL
SUM OF ALL RESPONSES TO PHQ QUESTIONS 1-9: 10
SUM OF ALL RESPONSES TO PHQ QUESTIONS 1-9: 10
SUM OF ALL RESPONSES TO PHQ9 QUESTIONS 1 & 2: 4
10. IF YOU CHECKED OFF ANY PROBLEMS, HOW DIFFICULT HAVE THESE PROBLEMS MADE IT FOR YOU TO DO YOUR WORK, TAKE CARE OF THINGS AT HOME, OR GET ALONG WITH OTHER PEOPLE: SOMEWHAT DIFFICULT
6. FEELING BAD ABOUT YOURSELF - OR THAT YOU ARE A FAILURE OR HAVE LET YOURSELF OR YOUR FAMILY DOWN: NOT AT ALL
8. MOVING OR SPEAKING SO SLOWLY THAT OTHER PEOPLE COULD HAVE NOTICED. OR THE OPPOSITE - BEING SO FIDGETY OR RESTLESS THAT YOU HAVE BEEN MOVING AROUND A LOT MORE THAN USUAL: NOT AT ALL
9. THOUGHTS THAT YOU WOULD BE BETTER OFF DEAD, OR OF HURTING YOURSELF: NOT AT ALL
SUM OF ALL RESPONSES TO PHQ QUESTIONS 1-9: 10
SUM OF ALL RESPONSES TO PHQ QUESTIONS 1-9: 10
1. LITTLE INTEREST OR PLEASURE IN DOING THINGS: MORE THAN HALF THE DAYS
7. TROUBLE CONCENTRATING ON THINGS, SUCH AS READING THE NEWSPAPER OR WATCHING TELEVISION: NOT AT ALL
9. THOUGHTS THAT YOU WOULD BE BETTER OFF DEAD, OR OF HURTING YOURSELF: NOT AT ALL
4. FEELING TIRED OR HAVING LITTLE ENERGY: MORE THAN HALF THE DAYS
SUM OF ALL RESPONSES TO PHQ QUESTIONS 1-9: 10
10. IF YOU CHECKED OFF ANY PROBLEMS, HOW DIFFICULT HAVE THESE PROBLEMS MADE IT FOR YOU TO DO YOUR WORK, TAKE CARE OF THINGS AT HOME, OR GET ALONG WITH OTHER PEOPLE: SOMEWHAT DIFFICULT
2. FEELING DOWN, DEPRESSED OR HOPELESS: MORE THAN HALF THE DAYS
3. TROUBLE FALLING OR STAYING ASLEEP: MORE THAN HALF THE DAYS
7. TROUBLE CONCENTRATING ON THINGS, SUCH AS READING THE NEWSPAPER OR WATCHING TELEVISION: NOT AT ALL
6. FEELING BAD ABOUT YOURSELF - OR THAT YOU ARE A FAILURE OR HAVE LET YOURSELF OR YOUR FAMILY DOWN: NOT AT ALL
SUM OF ALL RESPONSES TO PHQ9 QUESTIONS 1 & 2: 4
5. POOR APPETITE OR OVEREATING: MORE THAN HALF THE DAYS
4. FEELING TIRED OR HAVING LITTLE ENERGY: MORE THAN HALF THE DAYS

## 2024-05-09 NOTE — PROGRESS NOTES
HISTORY OF PRESENT ILLNESS:    Pt presents for follow up.  Hyst   Pt admits pain with intercourse still. Pain is lower pelvis, every time. No improvement with position change. Denies being dry. No urinary or GI symptoms.  Pt had stage 1 endometriosis.     PCP, Dr. Davenport, did bloodwork, testosterone is elevated. Liver enzymes are elevated. +HLD. Pt has fatty liver.    Pt admits bilateral breast pain, occasional. States it is worse than before. Pt had US done before that were normal.     Labs, breast US ordered reviewed.  Pt states breast pain comes and goes.    Trialed pelvic floor therapy, states she felt uncomfortable with it and wasn't getting much relief. Now getting pain outside of cycles.               Past Medical History:   Past Medical History:   Diagnosis Date    Anxiety     Asthma     Bipolar 1 disorder (HCC)     Depression     Diabetes (HCC)     Endometriosis     Stage 1    Fatty liver     HLD (hyperlipidemia)     LGSIL on Pap smear of cervix 2016    Neuropathy     age 12  presently also  BLE    PCOS (polycystic ovarian syndrome)                                              OB History    Para Term  AB Living   2 2 2     2   SAB IAB Ectopic Molar Multiple Live Births             2      # Outcome Date GA Lbr Zeke/2nd Weight Sex Delivery Anes PTL Lv   2 Term 17 39w0d  3.58 kg (7 lb 14.3 oz) F Vag-Spont EPI N JULIO   1 Term 16 39w1d   F Vag-Spont EPI N          Past Surgical History:   Past Surgical History:   Procedure Laterality Date    COLONOSCOPY      HYSTERECTOMY (CERVIX STATUS UNKNOWN) N/A 2022    LAPAROSCOPIC HYSTERECTOMY WITH BILATERAL SALPINGECTOMY performed by Cierra Mendiola MD at [facility] OR    NERVE SURGERY      bilateral in legs, most recent 5 yrs ago. Has had 5 surgeries total    TUBAL LIGATION Bilateral 11/15/2017        Allergies: Bee venom     Medications:   Current Outpatient Medications   Medication Sig Dispense Refill    norethindrone (ORTHO

## 2024-05-09 NOTE — PROGRESS NOTES
Patient here for F/U. Patient also reports she started Propranolol for anxiety, but does not know the dose.

## 2024-05-31 ENCOUNTER — APPOINTMENT (OUTPATIENT)
Dept: CT IMAGING | Age: 29
End: 2024-05-31
Payer: OTHER MISCELLANEOUS

## 2024-05-31 ENCOUNTER — HOSPITAL ENCOUNTER (EMERGENCY)
Age: 29
Discharge: HOME | End: 2024-05-31
Payer: OTHER MISCELLANEOUS

## 2024-05-31 VITALS
SYSTOLIC BLOOD PRESSURE: 117 MMHG | OXYGEN SATURATION: 99 % | HEIGHT: 65 IN | TEMPERATURE: 98.4 F | RESPIRATION RATE: 15 BRPM | WEIGHT: 215 LBS | DIASTOLIC BLOOD PRESSURE: 80 MMHG | BODY MASS INDEX: 35.82 KG/M2 | HEART RATE: 85 BPM
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
## 2024-05-31 DIAGNOSIS — S09.90XA MINOR HEAD INJURY WITHOUT LOSS OF CONSCIOUSNESS, INITIAL ENCOUNTER: Primary | ICD-10-CM

## 2024-05-31 PROCEDURE — 70450 CT HEAD/BRAIN W/O DYE: CPT

## 2024-05-31 PROCEDURE — 99284 EMERGENCY DEPT VISIT MOD MDM: CPT

## 2024-05-31 PROCEDURE — 6370000000 HC RX 637 (ALT 250 FOR IP): Performed by: PHYSICIAN ASSISTANT

## 2024-05-31 RX ORDER — METHOCARBAMOL 750 MG/1
750 TABLET, FILM COATED ORAL 4 TIMES DAILY
Qty: 20 TABLET | Refills: 0 | Status: SHIPPED | OUTPATIENT
Start: 2024-05-31 | End: 2024-06-05

## 2024-05-31 RX ORDER — ACETAMINOPHEN 325 MG/1
650 TABLET ORAL ONCE
Status: COMPLETED | OUTPATIENT
Start: 2024-05-31 | End: 2024-05-31

## 2024-05-31 RX ORDER — IBUPROFEN 600 MG/1
600 TABLET ORAL EVERY 6 HOURS PRN
Qty: 20 TABLET | Refills: 0 | Status: SHIPPED | OUTPATIENT
Start: 2024-05-31 | End: 2024-06-05

## 2024-05-31 RX ORDER — DIAZEPAM 2 MG/1
2 TABLET ORAL ONCE
Status: COMPLETED | OUTPATIENT
Start: 2024-05-31 | End: 2024-05-31

## 2024-05-31 RX ORDER — HYDROCODONE BITARTRATE AND ACETAMINOPHEN 5; 325 MG/1; MG/1
1 TABLET ORAL ONCE
Status: COMPLETED | OUTPATIENT
Start: 2024-05-31 | End: 2024-05-31

## 2024-05-31 RX ADMIN — HYDROCODONE BITARTRATE AND ACETAMINOPHEN 1 TABLET: 5; 325 TABLET ORAL at 22:40

## 2024-05-31 RX ADMIN — DIAZEPAM 2 MG: 2 TABLET ORAL at 20:04

## 2024-05-31 RX ADMIN — ACETAMINOPHEN 650 MG: 325 TABLET ORAL at 20:04

## 2024-05-31 ASSESSMENT — PAIN DESCRIPTION - ONSET: ONSET: ON-GOING

## 2024-05-31 ASSESSMENT — PAIN - FUNCTIONAL ASSESSMENT: PAIN_FUNCTIONAL_ASSESSMENT: 0-10

## 2024-05-31 ASSESSMENT — PAIN SCALES - GENERAL
PAINLEVEL_OUTOF10: 8
PAINLEVEL_OUTOF10: 6

## 2024-05-31 ASSESSMENT — PAIN DESCRIPTION - PAIN TYPE: TYPE: ACUTE PAIN

## 2024-05-31 ASSESSMENT — LIFESTYLE VARIABLES
HOW MANY STANDARD DRINKS CONTAINING ALCOHOL DO YOU HAVE ON A TYPICAL DAY: 1 OR 2
HOW OFTEN DO YOU HAVE A DRINK CONTAINING ALCOHOL: MONTHLY OR LESS

## 2024-05-31 ASSESSMENT — PAIN DESCRIPTION - DESCRIPTORS: DESCRIPTORS: DISCOMFORT

## 2024-05-31 ASSESSMENT — PAIN DESCRIPTION - FREQUENCY: FREQUENCY: CONTINUOUS

## 2024-05-31 ASSESSMENT — PAIN DESCRIPTION - LOCATION: LOCATION: HEAD
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
## 2025-02-17 ENCOUNTER — CLINICAL DOCUMENTATION (OUTPATIENT)
Dept: OBGYN | Age: 30
End: 2025-02-17

## 2025-02-17 ENCOUNTER — OFFICE VISIT (OUTPATIENT)
Dept: OBGYN | Age: 30
End: 2025-02-17
Payer: COMMERCIAL

## 2025-02-17 VITALS
WEIGHT: 205.4 LBS | RESPIRATION RATE: 20 BRPM | HEART RATE: 86 BPM | BODY MASS INDEX: 34.22 KG/M2 | SYSTOLIC BLOOD PRESSURE: 111 MMHG | TEMPERATURE: 97.9 F | DIASTOLIC BLOOD PRESSURE: 60 MMHG | HEIGHT: 65 IN

## 2025-02-17 DIAGNOSIS — R10.2 CHRONIC PELVIC PAIN IN FEMALE: ICD-10-CM

## 2025-02-17 DIAGNOSIS — N94.10 DYSPAREUNIA IN FEMALE: Primary | ICD-10-CM

## 2025-02-17 DIAGNOSIS — E28.2 PCOS (POLYCYSTIC OVARIAN SYNDROME): ICD-10-CM

## 2025-02-17 DIAGNOSIS — N80.9 ENDOMETRIOSIS: ICD-10-CM

## 2025-02-17 DIAGNOSIS — G89.29 CHRONIC PELVIC PAIN IN FEMALE: ICD-10-CM

## 2025-02-17 PROCEDURE — G8417 CALC BMI ABV UP PARAM F/U: HCPCS | Performed by: OBSTETRICS & GYNECOLOGY

## 2025-02-17 PROCEDURE — G8427 DOCREV CUR MEDS BY ELIG CLIN: HCPCS | Performed by: OBSTETRICS & GYNECOLOGY

## 2025-02-17 PROCEDURE — 1036F TOBACCO NON-USER: CPT | Performed by: OBSTETRICS & GYNECOLOGY

## 2025-02-17 PROCEDURE — 99213 OFFICE O/P EST LOW 20 MIN: CPT | Performed by: OBSTETRICS & GYNECOLOGY

## 2025-02-17 RX ORDER — COLESTIPOL HYDROCHLORIDE 1 G/1
TABLET ORAL
COMMUNITY
Start: 2024-11-15

## 2025-02-17 RX ORDER — LURASIDONE HYDROCHLORIDE 20 MG/1
TABLET, FILM COATED ORAL
COMMUNITY
Start: 2025-02-07

## 2025-02-17 RX ORDER — SEMAGLUTIDE 0.25 MG/.5ML
0.25 INJECTION, SOLUTION SUBCUTANEOUS
COMMUNITY

## 2025-02-17 RX ORDER — HYOSCYAMINE SULFATE 0.12 MG/1
TABLET SUBLINGUAL
COMMUNITY
Start: 2024-11-15

## 2025-02-17 RX ORDER — PANTOPRAZOLE SODIUM 40 MG/1
40 TABLET, DELAYED RELEASE ORAL EVERY MORNING
COMMUNITY
Start: 2024-11-15

## 2025-02-17 RX ORDER — ELAGOLIX 150 MG/1
1 TABLET, FILM COATED ORAL DAILY
Qty: 30 TABLET | Refills: 1 | Status: SHIPPED | OUTPATIENT
Start: 2025-02-17

## 2025-02-17 RX ORDER — LORAZEPAM 0.5 MG/1
TABLET ORAL
COMMUNITY
Start: 2025-02-07

## 2025-02-17 RX ORDER — PALIPERIDONE 3 MG/1
3 TABLET, EXTENDED RELEASE ORAL DAILY
COMMUNITY
Start: 2024-11-22

## 2025-02-17 SDOH — ECONOMIC STABILITY: FOOD INSECURITY: WITHIN THE PAST 12 MONTHS, THE FOOD YOU BOUGHT JUST DIDN'T LAST AND YOU DIDN'T HAVE MONEY TO GET MORE.: NEVER TRUE

## 2025-02-17 SDOH — ECONOMIC STABILITY: FOOD INSECURITY: WITHIN THE PAST 12 MONTHS, YOU WORRIED THAT YOUR FOOD WOULD RUN OUT BEFORE YOU GOT MONEY TO BUY MORE.: NEVER TRUE

## 2025-02-17 ASSESSMENT — PATIENT HEALTH QUESTIONNAIRE - PHQ9
2. FEELING DOWN, DEPRESSED OR HOPELESS: SEVERAL DAYS
SUM OF ALL RESPONSES TO PHQ QUESTIONS 1-9: 9
2. FEELING DOWN, DEPRESSED OR HOPELESS: SEVERAL DAYS
10. IF YOU CHECKED OFF ANY PROBLEMS, HOW DIFFICULT HAVE THESE PROBLEMS MADE IT FOR YOU TO DO YOUR WORK, TAKE CARE OF THINGS AT HOME, OR GET ALONG WITH OTHER PEOPLE: SOMEWHAT DIFFICULT
4. FEELING TIRED OR HAVING LITTLE ENERGY: NEARLY EVERY DAY
7. TROUBLE CONCENTRATING ON THINGS, SUCH AS READING THE NEWSPAPER OR WATCHING TELEVISION: NOT AT ALL
SUM OF ALL RESPONSES TO PHQ9 QUESTIONS 1 & 2: 2
SUM OF ALL RESPONSES TO PHQ QUESTIONS 1-9: 9
1. LITTLE INTEREST OR PLEASURE IN DOING THINGS: SEVERAL DAYS
SUM OF ALL RESPONSES TO PHQ QUESTIONS 1-9: 9
1. LITTLE INTEREST OR PLEASURE IN DOING THINGS: SEVERAL DAYS
3. TROUBLE FALLING OR STAYING ASLEEP: NEARLY EVERY DAY
8. MOVING OR SPEAKING SO SLOWLY THAT OTHER PEOPLE COULD HAVE NOTICED. OR THE OPPOSITE, BEING SO FIGETY OR RESTLESS THAT YOU HAVE BEEN MOVING AROUND A LOT MORE THAN USUAL: NOT AT ALL
SUM OF ALL RESPONSES TO PHQ QUESTIONS 1-9: 2
6. FEELING BAD ABOUT YOURSELF - OR THAT YOU ARE A FAILURE OR HAVE LET YOURSELF OR YOUR FAMILY DOWN: SEVERAL DAYS
9. THOUGHTS THAT YOU WOULD BE BETTER OFF DEAD, OR OF HURTING YOURSELF: NOT AT ALL
5. POOR APPETITE OR OVEREATING: NOT AT ALL
SUM OF ALL RESPONSES TO PHQ9 QUESTIONS 1 & 2: 2
SUM OF ALL RESPONSES TO PHQ QUESTIONS 1-9: 9

## 2025-02-17 NOTE — PROGRESS NOTES
Follow-up (30 y.o. female, here to discuss US results. Patient states she is still having really bad pain with intercourse.)      Patient's last menstrual period was 04/04/2022.    POC testing performed this visit:  [] YES  [x]NO    Did provider order testing:   [] YES  [x]NO    SPECIMENS COLLECTED: None    Did provider order Clinic Admin. Medication/ Immunization:   [] YES  [x]NO         Next office visit scheduled:  [] YES  [x]NO    Scheduled date: N follow up scheduled at this time    Discharge instructions have been discussed with the patient. Patient advised to call our office with any questions or concerns.   Voiced understanding.

## 2025-02-17 NOTE — PROGRESS NOTES
HISTORY OF PRESENT ILLNESS:    Pt presents for follow up/review of results for US done 2024. US was normal.    Hyst   Pt admits pain with intercourse still. Pain is lower pelvis, every time. No improvement with position change. Denies being dry. No urinary or GI symptoms.  Pt had stage 1 endometriosis.     PCP, Dr. Davenport, did bloodwork, testosterone is elevated. Liver enzymes are elevated. +HLD. Pt has fatty liver.    Trialed pelvic floor therapy, states she felt uncomfortable with it and wasn't getting much relief. Now getting pain outside of sex.     Pt trialed norethindrone. Did not help.  Lost insurance and stopped all meds    HgbA1C 7.5. started on wegovy. Lost 10 lbs.   On Latuda and ativan for anxiety.   ALT and AST around 100 in November.           Past Medical History:   Past Medical History:   Diagnosis Date    Anxiety     Asthma     Bipolar 1 disorder (HCC)     Depression     Diabetes (HCC)     Endometriosis     Stage 1    Fatty liver     HLD (hyperlipidemia)     LGSIL on Pap smear of cervix 2016    Neuropathy     age 12  presently also  BLE    PCOS (polycystic ovarian syndrome)                                              OB History    Para Term  AB Living   2 2 2     2   SAB IAB Ectopic Molar Multiple Live Births             2      # Outcome Date GA Lbr Zeke/2nd Weight Sex Type Anes PTL Lv   2 Term 17 39w0d  3.58 kg (7 lb 14.3 oz) F Vag-Spont EPI N JULIO   1 Term 16 39w1d   F Vag-Spont EPI N          Past Surgical History:   Past Surgical History:   Procedure Laterality Date    COLONOSCOPY      HYSTERECTOMY (CERVIX STATUS UNKNOWN) N/A 2022    LAPAROSCOPIC HYSTERECTOMY WITH BILATERAL SALPINGECTOMY performed by Cierra Mendiola MD at [facility] OR    NERVE SURGERY      bilateral in legs, most recent 5 yrs ago. Has had 5 surgeries total    TUBAL LIGATION Bilateral 11/15/2017        Allergies: Bee venom     Medications:   Current Outpatient Medications   Medication

## 2025-02-18 ENCOUNTER — CLINICAL DOCUMENTATION (OUTPATIENT)
Dept: OBGYN | Age: 30
End: 2025-02-18

## 2025-02-20 ENCOUNTER — TELEPHONE (OUTPATIENT)
Dept: OBGYN CLINIC | Age: 30
End: 2025-02-20

## 2025-02-20 NOTE — TELEPHONE ENCOUNTER
Patient wants to know what next step to take because her medicine co pay over $1,000 and its for orilissa and she can't afford to get it.

## 2025-02-20 NOTE — TELEPHONE ENCOUNTER
Patient calling in and wants to know her next step on this issue. I did suggest she call the drug  also and see if they have any programs and or on line coupons.

## 2025-02-26 NOTE — TELEPHONE ENCOUNTER
Patient called drug company, the savings program only works for private/commercial insurance not for state insurance. Patient states she has [employer] which is a form of medicaid.     Patient would like to know what to do from here since she can't afford orlissa.

## 2025-03-11 ENCOUNTER — OFFICE VISIT (OUTPATIENT)
Dept: OBGYN | Age: 30
End: 2025-03-11
Payer: COMMERCIAL

## 2025-03-11 VITALS
DIASTOLIC BLOOD PRESSURE: 63 MMHG | SYSTOLIC BLOOD PRESSURE: 119 MMHG | OXYGEN SATURATION: 98 % | WEIGHT: 202.6 LBS | BODY MASS INDEX: 33.71 KG/M2 | TEMPERATURE: 98.1 F | HEART RATE: 84 BPM

## 2025-03-11 DIAGNOSIS — N80.9 ENDOMETRIOSIS: Primary | ICD-10-CM

## 2025-03-11 PROCEDURE — 1036F TOBACCO NON-USER: CPT | Performed by: OBSTETRICS & GYNECOLOGY

## 2025-03-11 PROCEDURE — G8427 DOCREV CUR MEDS BY ELIG CLIN: HCPCS | Performed by: OBSTETRICS & GYNECOLOGY

## 2025-03-11 PROCEDURE — G8417 CALC BMI ABV UP PARAM F/U: HCPCS | Performed by: OBSTETRICS & GYNECOLOGY

## 2025-03-11 PROCEDURE — 99213 OFFICE O/P EST LOW 20 MIN: CPT | Performed by: OBSTETRICS & GYNECOLOGY

## 2025-03-11 RX ORDER — LAMOTRIGINE 25 MG/1
TABLET ORAL
COMMUNITY
Start: 2025-03-07

## 2025-03-11 RX ORDER — EMPAGLIFLOZIN 10 MG/1
10 TABLET, FILM COATED ORAL DAILY
COMMUNITY
Start: 2025-02-25

## 2025-03-11 NOTE — PROGRESS NOTES
HISTORY OF PRESENT ILLNESS:    Pt presents for follow up/review of results for US done 2024. US was normal.    Hyst   Pt admits pain with intercourse still. Pain is lower pelvis, every time. No improvement with position change. Denies being dry. No urinary or GI symptoms.  Pt had stage 1 endometriosis.     PCP, Dr. Davenport, did bloodwork, testosterone is elevated. Liver enzymes are elevated. +HLD. Pt has fatty liver.    Trialed pelvic floor therapy, states she felt uncomfortable with it and wasn't getting much relief. Now getting pain outside of sex.     Pt trialed norethindrone. Did not help.  Has had depo in the past, it did not help at all.   Lost insurance and stopped all meds    HgbA1C 7.5. started on wegovy. Lost 10 lbs.   On Latuda and ativan for anxiety.   ALT and AST around 100 in November.           Past Medical History:   Past Medical History:   Diagnosis Date    Anxiety     Asthma     Bipolar 1 disorder (HCC)     Depression     Diabetes (HCC)     Endometriosis     Stage 1    Fatty liver     HLD (hyperlipidemia)     LGSIL on Pap smear of cervix 2016    Neuropathy     age 12  presently also  BLE    PCOS (polycystic ovarian syndrome)                                              OB History    Para Term  AB Living   2 2 2   2   SAB IAB Ectopic Molar Multiple Live Births        2      # Outcome Date GA Lbr Zeke/2nd Weight Sex Type Anes PTL Lv   2 Term 17 39w0d  3.58 kg (7 lb 14.3 oz) F Vag-Spont EPI N JULIO   1 Term 16 39w1d   F Vag-Spont EPI N          Past Surgical History:   Past Surgical History:   Procedure Laterality Date    COLONOSCOPY      HYSTERECTOMY (CERVIX STATUS UNKNOWN) N/A 2022    LAPAROSCOPIC HYSTERECTOMY WITH BILATERAL SALPINGECTOMY performed by Cierra Mendiola MD at [facility] OR    NERVE SURGERY      bilateral in legs, most recent 5 yrs ago. Has had 5 surgeries total    TUBAL LIGATION Bilateral 11/15/2017        Allergies: Bee venom     Medications:

## 2025-03-11 NOTE — PROGRESS NOTES
Patient alert and pleasant with no complaints  Here todayfor medication f/u.  Discharge instructions have been discussed with the patient. Patient advised to call our office with any questions or concerns.   Voiced understanding.

## 2025-03-12 RX ORDER — LEUPROLIDE ACETATE 3.75 MG
3.75 KIT INTRAMUSCULAR ONCE
Qty: 1 KIT | Refills: 3 | Status: SHIPPED | OUTPATIENT
Start: 2025-03-12 | End: 2025-03-12

## 2025-03-12 RX ORDER — NORETHINDRONE 5 MG/1
5 TABLET ORAL DAILY
Qty: 30 TABLET | Refills: 3 | Status: SHIPPED | OUTPATIENT
Start: 2025-03-12

## 2025-03-13 ENCOUNTER — TELEPHONE (OUTPATIENT)
Dept: OBGYN | Age: 30
End: 2025-03-13

## 2025-03-13 NOTE — TELEPHONE ENCOUNTER
Patient called in stating the Lupron injection she was prescribed will need a prior auth per her pharmacy.  She also stated she spoke with her psychiatrist who felt it may be difficult to manage her psych meds with the Lupron and felt she should undergo surgical intervention instead.  Please advise.

## 2025-03-20 ENCOUNTER — PATIENT MESSAGE (OUTPATIENT)
Dept: OBGYN | Age: 30
End: 2025-03-20

## 2025-03-24 ENCOUNTER — TELEPHONE (OUTPATIENT)
Dept: OBGYN | Age: 30
End: 2025-03-24

## 2025-03-24 NOTE — TELEPHONE ENCOUNTER
Patient called in re: Sx scheduling. She was unsure if she was to call or if she was to wait for you to call. Patient has vacation time at the end of May, but will reschedule if needed.

## 2025-04-02 ENCOUNTER — PREP FOR PROCEDURE (OUTPATIENT)
Dept: OBGYN | Age: 30
End: 2025-04-02

## 2025-04-02 ENCOUNTER — TELEPHONE (OUTPATIENT)
Dept: OBGYN | Age: 30
End: 2025-04-02

## 2025-04-02 DIAGNOSIS — G89.29 CHRONIC FEMALE PELVIC PAIN: ICD-10-CM

## 2025-04-02 DIAGNOSIS — R10.2 CHRONIC FEMALE PELVIC PAIN: ICD-10-CM

## 2025-04-02 DIAGNOSIS — N80.9 ENDOMETRIOSIS: ICD-10-CM

## 2025-04-02 NOTE — TELEPHONE ENCOUNTER
Surgery scheduled 5/7/25 [address] [place] [address]  Pre-op appointment 4/30/25  Patient made aware of surgery date and time.  Patient voiced understanding   Diagnostic lap possible fulguration of endo possible GIOVANNI

## 2025-04-02 NOTE — TELEPHONE ENCOUNTER
Patient called and cancelled her med follow up appt due to medication was stopped per your advise.  Patient is waiting for surgery to be scheduled  Please advise

## 2025-04-30 ENCOUNTER — OFFICE VISIT (OUTPATIENT)
Dept: OBGYN | Age: 30
End: 2025-04-30
Payer: COMMERCIAL

## 2025-04-30 VITALS
BODY MASS INDEX: 33.96 KG/M2 | OXYGEN SATURATION: 99 % | DIASTOLIC BLOOD PRESSURE: 63 MMHG | WEIGHT: 204.1 LBS | SYSTOLIC BLOOD PRESSURE: 126 MMHG | HEART RATE: 92 BPM | TEMPERATURE: 98.6 F

## 2025-04-30 DIAGNOSIS — N80.9 ENDOMETRIOSIS: Primary | ICD-10-CM

## 2025-04-30 PROCEDURE — 99213 OFFICE O/P EST LOW 20 MIN: CPT | Performed by: OBSTETRICS & GYNECOLOGY

## 2025-04-30 PROCEDURE — G8427 DOCREV CUR MEDS BY ELIG CLIN: HCPCS | Performed by: OBSTETRICS & GYNECOLOGY

## 2025-04-30 PROCEDURE — 1036F TOBACCO NON-USER: CPT | Performed by: OBSTETRICS & GYNECOLOGY

## 2025-04-30 PROCEDURE — G8417 CALC BMI ABV UP PARAM F/U: HCPCS | Performed by: OBSTETRICS & GYNECOLOGY

## 2025-04-30 NOTE — PROGRESS NOTES
Patient alert and pleasant with no new complaints  Here today for pre-op appt.  DIAGNOSTINC LAPAROSCOPY POSSIBLE FULGURATION OF ENDOMETRIOSIS POSSIBLE LYSIS OF ADHESIONS on 5/7/25 at 1430 at the [facility] [place]  All pre-op information given to patient.  Date,time and place.   Patient voiced understanding.  Discharge instructions have been discussed with the patient. Patient advised to call our office with any questions or concerns.   Voiced understanding.

## 2025-04-30 NOTE — PROGRESS NOTES
Department of Gynecology   History and Physical        HISTORY OF PRESENT ILLNESS:                     The patient is a 30 y.o. female  who presents with history of endometriosis complaining of pain with sex. Pt failed medical management with norethindrone and depo. Declines further medical management and is seeking surgical management.       Past Medical History:        Diagnosis Date    Anxiety     Asthma     Bipolar 1 disorder (HCC)     Depression     Diabetes (HCC)     Endometriosis     Stage 1    Fatty liver     HLD (hyperlipidemia)     LGSIL on Pap smear of cervix 2016    Neuropathy     age 12  presently also  BLE    PCOS (polycystic ovarian syndrome)      Past Surgical History:        Procedure Laterality Date    COLONOSCOPY      HYSTERECTOMY (CERVIX STATUS UNKNOWN) N/A 2022    LAPAROSCOPIC HYSTERECTOMY WITH BILATERAL SALPINGECTOMY performed by Cierra Mendiola MD at [facility] OR    NERVE SURGERY      bilateral in legs, most recent 5 yrs ago. Has had 5 surgeries total    TUBAL LIGATION Bilateral 11/15/2017         OB History    Para Term  AB Living   2 2 2   2   SAB IAB Ectopic Molar Multiple Live Births        2      # Outcome Date GA Lbr Zeke/2nd Weight Sex Type Anes PTL Lv   2 Term 16 39w0d  3.58 kg (7 lb 14.3 oz) F Vag-Spont EPI N JULIO   1 Term 15 39w1d   F Vag-Spont EPI N        Medications Prior to Admission:   Not in a hospital admission.    Allergies:  Bee venom     Social History:  Social History     Socioeconomic History    Marital status:    Tobacco Use    Smoking status: Never    Smokeless tobacco: Never   Vaping Use    Vaping status: Never Used   Substance and Sexual Activity    Alcohol use: Yes     Comment: social    Drug use: No    Sexual activity: Yes     Partners: Male     Birth control/protection: Surgical     Social Drivers of Health     Financial Resource Strain: Low Risk  (2023)    Overall Financial Resource Strain (CARDIA)     Difficulty of

## 2025-05-05 NOTE — PROGRESS NOTES
PAT call completed. Pt is unable to come to PAT for completion of bloodwork before surgery due to work. Pt verbalized agreement to come  early on day  of surgery for completion of bloodwork.

## 2025-05-05 NOTE — PROGRESS NOTES
[facility] PRE-ADMISSION TESTING INSTRUCTIONS    The Preadmission Testing patient is instructed accordingly using the following criteria (check applicable):    ARRIVAL INSTRUCTIONS:  [x] The main hospital entrance will be closed for paving weather permitting. Please enter the hospital near the main ER entrance, follow signs for the appropriate door to enter through. Once inside, signs and staff will direct you to the surgical registration/waiting room. You will be discharged through the same door you entered.    [x] Bring photo ID and insurance card    [x] Bring in a copy of Living will or Durable Power of  papers.    [x] Please be sure to arrange for responsible adult to provide transportation to and from the hospital    [x] Please arrange for responsible adult to be with you for the 24 hour period post procedure due to having anesthesia    [x] If you awake am of surgery not feeling well or have temperature >100 please call [phone]    GENERAL INSTRUCTIONS:    [x]  No solid food after midnight. May have clear liquids until 4 hours prior to surgery. Examples include water, fruit juices (no pulp), jello, popsicles, black coffee or tea, 8 ounces. No milk products               No gum, candy or mints.    [x] You may brush your teeth, but do not swallow any water    [x] Take medications as instructed with 1-2 oz of water    [x] Stop herbal supplements and vitamins 5 days prior to procedure    [] Follow preop dosing of blood thinners per physician instructions    [] Take 1/2 dose of evening insulin, but no insulin after midnight    [] No oral diabetic medications after midnight    [] If diabetic and have low blood sugar or feel symptomatic, take 1-2oz apple juice only    [] Bring inhalers day of surgery    [] Bring C-PAP/ Bi-Pap day of surgery    [] Bring urine specimen day of surgery    [x] Shower or bath with soap, lather and rinse well, AM of Surgery, no lotion, powders or creams  to surgical site    [] Follow bowel prep as instructed per surgeon    [x] No tobacco products within 24 hours of surgery     [x] No alcohol or illegal drug use within 24 hours of surgery.    [x] Jewelry, body piercing's, eyeglasses, contact lenses and dentures are not permitted into surgery (bring cases)      [x] Please do not wear any nail polish, make up or hair products on the day of surgery    [x] You can expect a call the business day prior to procedure to notify you if your arrival time changes    [x] If you receive a survey after surgery we would greatly appreciate your comments    [] Parent/guardian of a minor must accompany their child and remain on the premises  the entire time they are under our care     [] Pediatric patients may bring favorite toy, blanket or comfort item with them    [] A caregiver or family member must remain with the patient during their stay if they are mentally handicapped, have dementia, disoriented or unable to use a call light or would be a safety concern if left unattended    [x] Please notify surgeon if you develop any illness between now and time of surgery (cold, cough, sore throat, fever, nausea, vomiting) or any signs of infections  including skin, wounds, and dental.    [x]  The Outpatient Pharmacy is available to fill your prescription here on your day of surgery, ask your preop nurse for details    [x] Other instructions wear comfortable clothes    EDUCATIONAL MATERIALS PROVIDED:

## 2025-05-06 ENCOUNTER — ANESTHESIA EVENT (OUTPATIENT)
Dept: OPERATING ROOM | Age: 30
End: 2025-05-06
Payer: COMMERCIAL

## 2025-05-07 ENCOUNTER — ANESTHESIA (OUTPATIENT)
Dept: OPERATING ROOM | Age: 30
End: 2025-05-07
Payer: COMMERCIAL

## 2025-05-07 ENCOUNTER — HOSPITAL ENCOUNTER (OUTPATIENT)
Age: 30
Setting detail: OUTPATIENT SURGERY
Discharge: HOME | End: 2025-05-07
Attending: OBSTETRICS & GYNECOLOGY | Admitting: OBSTETRICS & GYNECOLOGY
Payer: COMMERCIAL

## 2025-05-07 ENCOUNTER — HOSPITAL ENCOUNTER (OUTPATIENT)
Age: 30
Discharge: HOME | End: 2025-05-07

## 2025-05-07 VITALS
HEART RATE: 80 BPM | TEMPERATURE: 97.4 F | OXYGEN SATURATION: 98 % | HEIGHT: 64 IN | SYSTOLIC BLOOD PRESSURE: 125 MMHG | BODY MASS INDEX: 34.15 KG/M2 | WEIGHT: 200 LBS | DIASTOLIC BLOOD PRESSURE: 70 MMHG | RESPIRATION RATE: 16 BRPM

## 2025-05-07 DIAGNOSIS — R10.2 CHRONIC FEMALE PELVIC PAIN: ICD-10-CM

## 2025-05-07 DIAGNOSIS — G89.29 CHRONIC FEMALE PELVIC PAIN: ICD-10-CM

## 2025-05-07 DIAGNOSIS — G89.18 POSTOPERATIVE PAIN: ICD-10-CM

## 2025-05-07 DIAGNOSIS — N80.9 ENDOMETRIOSIS: Primary | ICD-10-CM

## 2025-05-07 LAB
ABO + RH BLD: NORMAL
ANION GAP SERPL CALCULATED.3IONS-SCNC: 13 MMOL/L (ref 7–16)
ARM BAND NUMBER: NORMAL
BLOOD BANK SAMPLE EXPIRATION: NORMAL
BLOOD GROUP ANTIBODIES SERPL: NEGATIVE
BUN SERPL-MCNC: 8 MG/DL (ref 6–20)
CALCIUM SERPL-MCNC: 9.5 MG/DL (ref 8.6–10.2)
CHLORIDE SERPL-SCNC: 104 MMOL/L (ref 98–107)
CO2 SERPL-SCNC: 23 MMOL/L (ref 22–29)
CREAT SERPL-MCNC: 0.7 MG/DL (ref 0.5–1)
EKG ATRIAL RATE: 88 BPM
EKG P AXIS: 45 DEGREES
EKG P-R INTERVAL: 154 MS
EKG Q-T INTERVAL: 370 MS
EKG QRS DURATION: 84 MS
EKG QTC CALCULATION (BAZETT): 447 MS
EKG R AXIS: 11 DEGREES
EKG T AXIS: 20 DEGREES
EKG VENTRICULAR RATE: 88 BPM
ERYTHROCYTE [DISTWIDTH] IN BLOOD BY AUTOMATED COUNT: 13.5 % (ref 11.5–15)
GFR, ESTIMATED: >90 ML/MIN/1.73M2
GLUCOSE SERPL-MCNC: 84 MG/DL (ref 74–99)
HCT VFR BLD AUTO: 43.6 % (ref 34–48)
HGB BLD-MCNC: 14.6 G/DL (ref 11.5–15.5)
MCH RBC QN AUTO: 28.5 PG (ref 26–35)
MCHC RBC AUTO-ENTMCNC: 33.5 G/DL (ref 32–34.5)
MCV RBC AUTO: 85.2 FL (ref 80–99.9)
PLATELET # BLD AUTO: 257 K/UL (ref 130–450)
PMV BLD AUTO: 10.2 FL (ref 7–12)
POTASSIUM SERPL-SCNC: 3.6 MMOL/L (ref 3.5–5)
RBC # BLD AUTO: 5.12 M/UL (ref 3.5–5.5)
SODIUM SERPL-SCNC: 140 MMOL/L (ref 132–146)
WBC OTHER # BLD: 7 K/UL (ref 4.5–11.5)

## 2025-05-07 PROCEDURE — 2500000003 HC RX 250 WO HCPCS: Performed by: NURSE ANESTHETIST, CERTIFIED REGISTERED

## 2025-05-07 PROCEDURE — 88305 TISSUE EXAM BY PATHOLOGIST: CPT

## 2025-05-07 PROCEDURE — 80048 BASIC METABOLIC PNL TOTAL CA: CPT

## 2025-05-07 PROCEDURE — 86850 RBC ANTIBODY SCREEN: CPT

## 2025-05-07 PROCEDURE — 86900 BLOOD TYPING SEROLOGIC ABO: CPT

## 2025-05-07 PROCEDURE — 7100000000 HC PACU RECOVERY - FIRST 15 MIN: Performed by: OBSTETRICS & GYNECOLOGY

## 2025-05-07 PROCEDURE — 85027 COMPLETE CBC AUTOMATED: CPT

## 2025-05-07 PROCEDURE — 2580000003 HC RX 258: Performed by: NURSE ANESTHETIST, CERTIFIED REGISTERED

## 2025-05-07 PROCEDURE — 3600000013 HC SURGERY LEVEL 3 ADDTL 15MIN: Performed by: OBSTETRICS & GYNECOLOGY

## 2025-05-07 PROCEDURE — 7100000011 HC PHASE II RECOVERY - ADDTL 15 MIN: Performed by: OBSTETRICS & GYNECOLOGY

## 2025-05-07 PROCEDURE — 6360000002 HC RX W HCPCS: Performed by: ANESTHESIOLOGY

## 2025-05-07 PROCEDURE — 7100000010 HC PHASE II RECOVERY - FIRST 15 MIN: Performed by: OBSTETRICS & GYNECOLOGY

## 2025-05-07 PROCEDURE — 7100000001 HC PACU RECOVERY - ADDTL 15 MIN: Performed by: OBSTETRICS & GYNECOLOGY

## 2025-05-07 PROCEDURE — 2720000010 HC SURG SUPPLY STERILE: Performed by: OBSTETRICS & GYNECOLOGY

## 2025-05-07 PROCEDURE — 3700000001 HC ADD 15 MINUTES (ANESTHESIA): Performed by: OBSTETRICS & GYNECOLOGY

## 2025-05-07 PROCEDURE — 6360000002 HC RX W HCPCS: Performed by: NURSE ANESTHETIST, CERTIFIED REGISTERED

## 2025-05-07 PROCEDURE — 3600000003 HC SURGERY LEVEL 3 BASE: Performed by: OBSTETRICS & GYNECOLOGY

## 2025-05-07 PROCEDURE — 86901 BLOOD TYPING SEROLOGIC RH(D): CPT

## 2025-05-07 PROCEDURE — 2709999900 HC NON-CHARGEABLE SUPPLY: Performed by: OBSTETRICS & GYNECOLOGY

## 2025-05-07 PROCEDURE — 3700000000 HC ANESTHESIA ATTENDED CARE: Performed by: OBSTETRICS & GYNECOLOGY

## 2025-05-07 RX ORDER — IPRATROPIUM BROMIDE AND ALBUTEROL SULFATE 2.5; .5 MG/3ML; MG/3ML
1 SOLUTION RESPIRATORY (INHALATION)
Status: DISCONTINUED | OUTPATIENT
Start: 2025-05-07 | End: 2025-05-07 | Stop reason: HOSPADM

## 2025-05-07 RX ORDER — ONDANSETRON 2 MG/ML
4 INJECTION INTRAMUSCULAR; INTRAVENOUS
Status: DISCONTINUED | OUTPATIENT
Start: 2025-05-07 | End: 2025-05-07 | Stop reason: HOSPADM

## 2025-05-07 RX ORDER — HYDRALAZINE HYDROCHLORIDE 20 MG/ML
10 INJECTION INTRAMUSCULAR; INTRAVENOUS
Status: DISCONTINUED | OUTPATIENT
Start: 2025-05-07 | End: 2025-05-07 | Stop reason: HOSPADM

## 2025-05-07 RX ORDER — DEXMEDETOMIDINE HYDROCHLORIDE 100 UG/ML
INJECTION, SOLUTION INTRAVENOUS
Status: DISCONTINUED | OUTPATIENT
Start: 2025-05-07 | End: 2025-05-07 | Stop reason: SDUPTHER

## 2025-05-07 RX ORDER — FENTANYL CITRATE 50 UG/ML
INJECTION, SOLUTION INTRAMUSCULAR; INTRAVENOUS
Status: DISCONTINUED | OUTPATIENT
Start: 2025-05-07 | End: 2025-05-07 | Stop reason: SDUPTHER

## 2025-05-07 RX ORDER — NALOXONE HYDROCHLORIDE 0.4 MG/ML
INJECTION, SOLUTION INTRAMUSCULAR; INTRAVENOUS; SUBCUTANEOUS PRN
Status: DISCONTINUED | OUTPATIENT
Start: 2025-05-07 | End: 2025-05-07 | Stop reason: HOSPADM

## 2025-05-07 RX ORDER — LABETALOL HYDROCHLORIDE 5 MG/ML
INJECTION, SOLUTION INTRAVENOUS
Status: DISCONTINUED | OUTPATIENT
Start: 2025-05-07 | End: 2025-05-07 | Stop reason: SDUPTHER

## 2025-05-07 RX ORDER — ROCURONIUM BROMIDE 10 MG/ML
INJECTION, SOLUTION INTRAVENOUS
Status: DISCONTINUED | OUTPATIENT
Start: 2025-05-07 | End: 2025-05-07 | Stop reason: SDUPTHER

## 2025-05-07 RX ORDER — SODIUM CHLORIDE 0.9 % (FLUSH) 0.9 %
5-40 SYRINGE (ML) INJECTION EVERY 12 HOURS SCHEDULED
Status: DISCONTINUED | OUTPATIENT
Start: 2025-05-07 | End: 2025-05-07 | Stop reason: HOSPADM

## 2025-05-07 RX ORDER — MEPERIDINE HYDROCHLORIDE 50 MG/ML
12.5 INJECTION INTRAMUSCULAR; INTRAVENOUS; SUBCUTANEOUS EVERY 5 MIN PRN
Status: DISCONTINUED | OUTPATIENT
Start: 2025-05-07 | End: 2025-05-07 | Stop reason: HOSPADM

## 2025-05-07 RX ORDER — SODIUM CHLORIDE 9 MG/ML
INJECTION, SOLUTION INTRAVENOUS PRN
Status: DISCONTINUED | OUTPATIENT
Start: 2025-05-07 | End: 2025-05-07 | Stop reason: HOSPADM

## 2025-05-07 RX ORDER — LABETALOL HYDROCHLORIDE 5 MG/ML
10 INJECTION, SOLUTION INTRAVENOUS
Status: DISCONTINUED | OUTPATIENT
Start: 2025-05-07 | End: 2025-05-07 | Stop reason: HOSPADM

## 2025-05-07 RX ORDER — ONDANSETRON 2 MG/ML
INJECTION INTRAMUSCULAR; INTRAVENOUS
Status: DISCONTINUED | OUTPATIENT
Start: 2025-05-07 | End: 2025-05-07 | Stop reason: SDUPTHER

## 2025-05-07 RX ORDER — SODIUM CHLORIDE, SODIUM LACTATE, POTASSIUM CHLORIDE, CALCIUM CHLORIDE 600; 310; 30; 20 MG/100ML; MG/100ML; MG/100ML; MG/100ML
INJECTION, SOLUTION INTRAVENOUS CONTINUOUS
Status: DISCONTINUED | OUTPATIENT
Start: 2025-05-07 | End: 2025-05-07 | Stop reason: HOSPADM

## 2025-05-07 RX ORDER — LIDOCAINE HYDROCHLORIDE 20 MG/ML
INJECTION, SOLUTION EPIDURAL; INFILTRATION; INTRACAUDAL; PERINEURAL
Status: DISCONTINUED | OUTPATIENT
Start: 2025-05-07 | End: 2025-05-07 | Stop reason: SDUPTHER

## 2025-05-07 RX ORDER — SODIUM CHLORIDE 0.9 % (FLUSH) 0.9 %
5-40 SYRINGE (ML) INJECTION PRN
Status: DISCONTINUED | OUTPATIENT
Start: 2025-05-07 | End: 2025-05-07 | Stop reason: HOSPADM

## 2025-05-07 RX ORDER — HYDROCODONE BITARTRATE AND ACETAMINOPHEN 5; 325 MG/1; MG/1
1 TABLET ORAL EVERY 4 HOURS PRN
Qty: 20 TABLET | Refills: 0 | Status: SHIPPED | OUTPATIENT
Start: 2025-05-07 | End: 2025-05-10

## 2025-05-07 RX ORDER — SODIUM CHLORIDE 9 MG/ML
INJECTION, SOLUTION INTRAVENOUS
Status: DISCONTINUED | OUTPATIENT
Start: 2025-05-07 | End: 2025-05-07 | Stop reason: SDUPTHER

## 2025-05-07 RX ORDER — METHOCARBAMOL 100 MG/ML
1000 INJECTION, SOLUTION INTRAMUSCULAR; INTRAVENOUS ONCE
Status: COMPLETED | OUTPATIENT
Start: 2025-05-07 | End: 2025-05-07

## 2025-05-07 RX ORDER — MIDAZOLAM HYDROCHLORIDE 2 MG/2ML
2 INJECTION, SOLUTION INTRAMUSCULAR; INTRAVENOUS
Status: DISCONTINUED | OUTPATIENT
Start: 2025-05-07 | End: 2025-05-07 | Stop reason: HOSPADM

## 2025-05-07 RX ORDER — DEXAMETHASONE SODIUM PHOSPHATE 4 MG/ML
INJECTION, SOLUTION INTRA-ARTICULAR; INTRALESIONAL; INTRAMUSCULAR; INTRAVENOUS; SOFT TISSUE
Status: DISCONTINUED | OUTPATIENT
Start: 2025-05-07 | End: 2025-05-07 | Stop reason: SDUPTHER

## 2025-05-07 RX ORDER — PROPOFOL 10 MG/ML
INJECTION, EMULSION INTRAVENOUS
Status: DISCONTINUED | OUTPATIENT
Start: 2025-05-07 | End: 2025-05-07 | Stop reason: SDUPTHER

## 2025-05-07 RX ORDER — MIDAZOLAM HYDROCHLORIDE 1 MG/ML
INJECTION, SOLUTION INTRAMUSCULAR; INTRAVENOUS
Status: DISCONTINUED | OUTPATIENT
Start: 2025-05-07 | End: 2025-05-07 | Stop reason: SDUPTHER

## 2025-05-07 RX ADMIN — DEXMEDETOMIDINE 10 MCG: 100 INJECTION, SOLUTION INTRAVENOUS at 15:59

## 2025-05-07 RX ADMIN — SODIUM CHLORIDE: 9 INJECTION, SOLUTION INTRAVENOUS at 14:55

## 2025-05-07 RX ADMIN — LIDOCAINE HYDROCHLORIDE 100 MG: 20 INJECTION, SOLUTION EPIDURAL; INFILTRATION; INTRACAUDAL; PERINEURAL at 15:03

## 2025-05-07 RX ADMIN — HYDROMORPHONE HYDROCHLORIDE 0.5 MG: 1 INJECTION, SOLUTION INTRAMUSCULAR; INTRAVENOUS; SUBCUTANEOUS at 16:30

## 2025-05-07 RX ADMIN — LABETALOL HYDROCHLORIDE 5 MG: 5 INJECTION INTRAVENOUS at 15:32

## 2025-05-07 RX ADMIN — FENTANYL CITRATE 50 MCG: 50 INJECTION, SOLUTION INTRAMUSCULAR; INTRAVENOUS at 16:06

## 2025-05-07 RX ADMIN — METHOCARBAMOL 1000 MG: 100 INJECTION INTRAMUSCULAR; INTRAVENOUS at 16:37

## 2025-05-07 RX ADMIN — SUGAMMADEX 200 MG: 100 INJECTION, SOLUTION INTRAVENOUS at 15:58

## 2025-05-07 RX ADMIN — ROCURONIUM BROMIDE 50 MG: 10 INJECTION, SOLUTION INTRAVENOUS at 15:03

## 2025-05-07 RX ADMIN — FENTANYL CITRATE 100 MCG: 50 INJECTION, SOLUTION INTRAMUSCULAR; INTRAVENOUS at 15:03

## 2025-05-07 RX ADMIN — DEXMEDETOMIDINE 10 MCG: 100 INJECTION, SOLUTION INTRAVENOUS at 16:01

## 2025-05-07 RX ADMIN — HYDROMORPHONE HYDROCHLORIDE 0.5 MG: 1 INJECTION, SOLUTION INTRAMUSCULAR; INTRAVENOUS; SUBCUTANEOUS at 16:24

## 2025-05-07 RX ADMIN — PROPOFOL 200 MG: 10 INJECTION, EMULSION INTRAVENOUS at 15:03

## 2025-05-07 RX ADMIN — MIDAZOLAM 2 MG: 1 INJECTION INTRAMUSCULAR; INTRAVENOUS at 14:59

## 2025-05-07 RX ADMIN — ONDANSETRON 4 MG: 2 INJECTION INTRAMUSCULAR; INTRAVENOUS at 15:55

## 2025-05-07 RX ADMIN — SODIUM CHLORIDE: 9 INJECTION, SOLUTION INTRAVENOUS at 16:00

## 2025-05-07 RX ADMIN — DEXAMETHASONE SODIUM PHOSPHATE 10 MG: 4 INJECTION, SOLUTION INTRAMUSCULAR; INTRAVENOUS at 15:12

## 2025-05-07 ASSESSMENT — PAIN - FUNCTIONAL ASSESSMENT
PAIN_FUNCTIONAL_ASSESSMENT: PREVENTS OR INTERFERES SOME ACTIVE ACTIVITIES AND ADLS
PAIN_FUNCTIONAL_ASSESSMENT: 0-10
PAIN_FUNCTIONAL_ASSESSMENT: PREVENTS OR INTERFERES SOME ACTIVE ACTIVITIES AND ADLS
PAIN_FUNCTIONAL_ASSESSMENT: PREVENTS OR INTERFERES SOME ACTIVE ACTIVITIES AND ADLS
PAIN_FUNCTIONAL_ASSESSMENT: 0-10

## 2025-05-07 ASSESSMENT — PAIN SCALES - GENERAL
PAINLEVEL_OUTOF10: 4
PAINLEVEL_OUTOF10: 8
PAINLEVEL_OUTOF10: 8

## 2025-05-07 ASSESSMENT — PAIN DESCRIPTION - ORIENTATION
ORIENTATION: INNER

## 2025-05-07 ASSESSMENT — PAIN DESCRIPTION - LOCATION
LOCATION: ABDOMEN

## 2025-05-07 ASSESSMENT — PAIN DESCRIPTION - DESCRIPTORS
DESCRIPTORS: DISCOMFORT

## 2025-05-07 NOTE — ANESTHESIA POSTPROCEDURE EVALUATION
Department of Anesthesiology  Postprocedure Note    Patient: Antonella Hill  MRN: [id number]  YOB: 1995  Date of evaluation: 5/7/2025    Procedure Summary       Date: 05/07/25 Room / Location: [address]    Anesthesia Start: 1459 Anesthesia Stop: 1610    Procedure: DIAGNOSTIC LAPAROSCOPY LEFT OVARIAN  CYST BIOIPSY (Abdomen) Diagnosis:       Endometriosis      Chronic female pelvic pain      (Endometriosis [N80.9])      (Chronic female pelvic pain [R10.2, G89.29])    Surgeons: Christa Ventura DO Responsible Provider: Fozia Malave DO    Anesthesia Type: General ASA Status: 3            Anesthesia Type: General    Edgar Phase I: Edgar Score: 10    Edgar Phase II:      Anesthesia Post Evaluation    Patient location during evaluation: PACU  Patient participation: complete - patient participated  Level of consciousness: awake  Pain score: 0  Airway patency: patent  Nausea & Vomiting: no nausea and no vomiting  Cardiovascular status: blood pressure returned to baseline and hemodynamically stable  Respiratory status: acceptable and face mask  Hydration status: euvolemic  Pain management: adequate        No notable events documented.

## 2025-05-07 NOTE — BRIEF OP NOTE
Brief Postoperative Note      Patient: Antonella Hill  YOB: 1995  MRN: [id number]    Date of Procedure: 5/7/2025    Pre-Op Diagnosis Codes:      * Endometriosis [N80.9]     * Chronic female pelvic pain [R10.2, G89.29]    Post-Op Diagnosis: Same       Procedure(s):  DIAGNOSTIC LAPAROSCOPY POSSIBLE FULGURATION OF ENDOMETRIOSIS    Surgeon(s):  Christa Ventura DO Sipusic, Elizabeth, MD    Assistant:  * No surgical staff found *    Anesthesia: General    Estimated Blood Loss (mL): see op note    Complications: None    Specimens:   ID Type Source Tests Collected by Time Destination   A : LEFT CYST Tissue Tissue SURGICAL PATHOLOGY Christa Ventura DO 5/7/2025 1553        Implants:  * No implants in log *      Drains:   Urinary Catheter 05/07/25 2 Way (Active)       Findings:  Infection Present At Time Of Surgery (PATOS) (choose all levels that have infection present):  No infection present  Other Findings: Endometrial implants near cuff, right ovary with simple cyst, taught cystic structure noted near left ovary, adhesions    I was present to assist Dr. Ventura with port placement, cauterization and ligation of vessels, and suturing as there was not a resident available.      Electronically signed by Cierra Mendiola MD on 5/7/2025 at 3:56 PM

## 2025-05-07 NOTE — OP NOTE
Operative Note      Patient: Antonella Hill  YOB: 1995  MRN: [id number]    Date of Procedure: 5/7/2025    Pre-Op Diagnosis Codes:      * Endometriosis [N80.9]     * Chronic female pelvic pain [R10.2, G89.29]    Post-Op Diagnosis: Same, left hydrosalpinx, left adnexal adhesions, Stage 1 endometriosis       Procedure(s):  DIAGNOSTIC LAPAROSCOPY POSSIBLE FULGURATION OF ENDOMETRIOSIS POSSIBLE LYSIS OF ADHESIONS          ++LATEX ALLERGY++    Surgeon(s):  Christa Ventura DO Sipusic, Elizabeth, MD    Assistant:   * No surgical staff found *    Anesthesia: General    Estimated Blood Loss (mL): Minimal    Complications: None    Specimens:   ID Type Source Tests Collected by Time Destination   A : LEFT CYST Tissue Tissue SURGICAL PATHOLOGY Christa Ventura DO 5/7/2025 1553        Implants:  * No implants in log *      Drains: * No LDAs found *    Findings:  Infection Present At Time Of Surgery (PATOS) (choose all levels that have infection present):  No infection present  Other Findings: The patient is a 29 yo  female with normal age specific female secondary sex characteristics and escutcheon. Vaginal mucosa was of normal morphology. Cervix was surgically absent. Laparoscopic examination revealed surgically absent uterus and right fallopian tube. The right ovary was of normal morphology with a 2 cm follicular cyst. There was 2 small endometrial implants, one on right pelvic side wall near ureter and the one on the right uterosacral ligament. There were filmy adhesions of the bowel to the left adnexa. There was a cyst vs dilated remnant of the left fallopian tube on the left ovary. Left ovary appeared normal. Remainder of abdomen and pelvis without visible pathology.    DESCRIPTION OF PROCEDURE:  The patient was taken to the operating room where general anesthesia was found to be adequate, placed in the dorsal lithotomy position.  Abdomen, perineum, and vagina were prepped and draped in the normal

## 2025-05-07 NOTE — ANESTHESIA PRE PROCEDURE
Department of Anesthesiology  Preprocedure Note       Name:  Antonella Hill   Age:  30 y.o.  :  1995                                          MRN:  [id number]         Date:  2025      Surgeon: Surgeon(s):  Christa Ventura DO    Procedure: Procedure(s):  DIAGNOSTIC LAPAROSCOPY POSSIBLE FULGURATION OF ENDOMETRIOSIS POSSIBLE LYSIS OF ADHESIONS          ++LATEX ALLERGY++    Medications prior to admission:   Prior to Admission medications    Medication Sig Start Date End Date Taking? Authorizing Provider   LORazepam (ATIVAN) 0.5 MG tablet TAKE 1 TABLET BY MOUTH ONCE A DAY AS NEEDED 25  Yes Provider, MD Teodoro   colestipol (COLESTID) 1 g tablet TAKE 1 TO 2 TABLETS BY MOUTH EVERY MORNING 11/15/24  Yes Provider, MD Teodoro   lurasidone (LATUDA) 20 MG TABS tablet TAKE 1 TABLET BY MOUTH DAILY TAKE WITH 350 CALORIES 25  Yes Provider, MD Teodoro   hyoscyamine (LEVSIN/SL) 0.125 MG sublingual tablet TAKE 1 TO 2 TABLETS UNDER THE TONGUE EVERY 8 HOURS IF NEEDED FOR CRAMPING AND DIARRHEA 11/15/24  Yes Provider, MD Teodoro   pantoprazole (PROTONIX) 40 MG tablet Take 1 tablet by mouth every morning 11/15/24  Yes Provider, MD Teodoro   ibuprofen (ADVIL;MOTRIN) 600 MG tablet Take 1 tablet by mouth every 6 hours as needed for Pain 24 Yes Danna Hills PA-C   leuprolide (LUPRON DEPOT, 1-MONTH,) 3.75 MG injection Inject 3.75 mg into the muscle once for 1 dose  Patient not taking: Reported on 2025 3/12/25 3/12/25  Christa Ventura DO   norethindrone (AYGESTIN) 5 MG tablet Take 1 tablet by mouth daily  Patient not taking: Reported on 2025 3/12/25   Christa Ventura DO   JARDIANCE 10 MG tablet Take 1 tablet by mouth daily  Patient not taking: Reported on 2025   ProviderTeodoro MD   lamoTRIgine (LAMICTAL) 25 MG tablet TAKE 1 TABLET BY MOUTH ONCE A DAY FOR TWO WEEKS THEN TAKE 2 TABLETS BY MOUTH ONCE A DAY FOR TWO WEEKS; STOP IF RASH OCCURS  Patient not taking:

## 2025-05-07 NOTE — H&P
4/30/2025)  LORazepam (ATIVAN) 0.5 MG tablet, TAKE 1 TABLET BY MOUTH ONCE A DAY AS NEEDED  colestipol (COLESTID) 1 g tablet, TAKE 1 TO 2 TABLETS BY MOUTH EVERY MORNING  lurasidone (LATUDA) 20 MG TABS tablet, TAKE 1 TABLET BY MOUTH DAILY TAKE WITH 350 CALORIES  paliperidone (INVEGA) 3 MG extended release tablet, Take 1 tablet by mouth daily (Patient not taking: Reported on 2/17/2025)  hyoscyamine (LEVSIN/SL) 0.125 MG sublingual tablet, TAKE 1 TO 2 TABLETS UNDER THE TONGUE EVERY 8 HOURS IF NEEDED FOR CRAMPING AND DIARRHEA  pantoprazole (PROTONIX) 40 MG tablet, Take 1 tablet by mouth every morning (Patient not taking: Reported on 4/30/2025)  Semaglutide-Weight Management (WEGOVY) 0.25 MG/0.5ML SOAJ SC injection, Inject 0.25 mg into the skin every 7 days (Patient not taking: Reported on 4/30/2025)  Elagolix Sodium (ORILISSA) 150 MG TABS, Take 1 tablet by mouth daily (Patient not taking: Reported on 4/30/2025)  ibuprofen (ADVIL;MOTRIN) 600 MG tablet, Take 1 tablet by mouth every 6 hours as needed for Pain  pioglitazone (ACTOS) 15 MG tablet, Take 1 tablet by mouth daily (Patient not taking: Reported on 2/17/2025)  CAPLYTA 42 MG capsule, Take 1 capsule by mouth daily (Patient not taking: Reported on 5/5/2025)  butalbital-APAP-caffeine -40 MG CAPS per capsule, 1 capsule as needed Orally every 6 hrs prn for 5 days (Patient not taking: Reported on 5/5/2025)  omeprazole (PRILOSEC) 40 MG delayed release capsule,     Allergies:  Latex and Bee venom     Social History:  Social History     Socioeconomic History    Marital status:      Spouse name: None    Number of children: None    Years of education: None    Highest education level: None   Tobacco Use    Smoking status: Never    Smokeless tobacco: Never   Vaping Use    Vaping status: Never Used   Substance and Sexual Activity    Alcohol use: Yes     Comment: social    Drug use: No     Social Drivers of Health     Financial Resource Strain: Low Risk  (12/28/2023)

## 2025-05-08 NOTE — ADDENDUM NOTE
Addendum  created 05/08/25 0651 by Lizy Horne, PATRICIA - CRNA    Attestation recorded in Intraprocedure, Flowsheet accepted, Intraprocedure Attestations filed

## 2025-05-14 ENCOUNTER — TELEMEDICINE (OUTPATIENT)
Dept: OBGYN | Age: 30
End: 2025-05-14

## 2025-05-14 DIAGNOSIS — Z98.890 POSTOPERATIVE STATE: Primary | ICD-10-CM

## 2025-05-14 LAB — SURGICAL PATHOLOGY REPORT: NORMAL

## 2025-05-14 PROCEDURE — 99024 POSTOP FOLLOW-UP VISIT: CPT | Performed by: OBSTETRICS & GYNECOLOGY

## 2025-05-14 RX ORDER — ACETAMINOPHEN AND CODEINE PHOSPHATE 120; 12 MG/5ML; MG/5ML
1 SOLUTION ORAL DAILY
Qty: 30 TABLET | Refills: 3 | Status: SHIPPED | OUTPATIENT
Start: 2025-05-14

## 2025-05-14 NOTE — PROGRESS NOTES
Never   Substance Use Topics    Alcohol use: Yes     Comment: social        Family History:   Family History   Problem Relation Age of Onset    Cancer Mother         cervical cancer    Hypertension Father     Heart Disease Father     Lung Cancer Father     Asthma Father     Breast Cancer Maternal Great Grandmother        REVIEW OF SYSTEMS:    Constitutional: negative  HEENT: negative  Breast: negative  Respiratory: negative  Cardiovascular: negative  Gastrointestinal: negative  Genitourinary:negative  Integument: negative  Neurological: negative  Endocrine: negative                ASSESSMENT :    Diagnosis Orders   1. Postoperative state             PLAN:    Postop instructions given. Reviewed findings of surgery. All questions answered.   Norethindrone for suppresion. Consider oophorectomy vs MIGS referral if no relief.  Pt did not want orilissa    Return in about 3 months (around 8/14/2025) for Medication check.      Orders Placed This Encounter   Medications    norethindrone (ORTHO MICRONOR) 0.35 MG tablet     Sig: Take 1 tablet by mouth daily     Dispense:  30 tablet     Refill:  3       No orders of the defined types were placed in this encounter.        Electronically signed by Christa Ventura DO on 5/14/25

## 2025-08-27 ENCOUNTER — OFFICE VISIT (OUTPATIENT)
Dept: OBGYN | Age: 30
End: 2025-08-27
Payer: COMMERCIAL

## 2025-08-27 VITALS
SYSTOLIC BLOOD PRESSURE: 120 MMHG | BODY MASS INDEX: 34.02 KG/M2 | DIASTOLIC BLOOD PRESSURE: 64 MMHG | WEIGHT: 198.2 LBS | HEART RATE: 71 BPM

## 2025-08-27 DIAGNOSIS — R10.2 CHRONIC FEMALE PELVIC PAIN: ICD-10-CM

## 2025-08-27 DIAGNOSIS — N80.9 ENDOMETRIOSIS: Primary | ICD-10-CM

## 2025-08-27 DIAGNOSIS — G89.29 CHRONIC FEMALE PELVIC PAIN: ICD-10-CM

## 2025-08-27 DIAGNOSIS — N94.10 DYSPAREUNIA IN FEMALE: ICD-10-CM

## 2025-08-27 DIAGNOSIS — E28.2 PCOS (POLYCYSTIC OVARIAN SYNDROME): ICD-10-CM

## 2025-08-27 PROCEDURE — G8417 CALC BMI ABV UP PARAM F/U: HCPCS | Performed by: OBSTETRICS & GYNECOLOGY

## 2025-08-27 PROCEDURE — G8427 DOCREV CUR MEDS BY ELIG CLIN: HCPCS | Performed by: OBSTETRICS & GYNECOLOGY

## 2025-08-27 PROCEDURE — 1036F TOBACCO NON-USER: CPT | Performed by: OBSTETRICS & GYNECOLOGY

## 2025-08-27 PROCEDURE — 99213 OFFICE O/P EST LOW 20 MIN: CPT | Performed by: OBSTETRICS & GYNECOLOGY

## 2025-08-27 RX ORDER — LITHIUM CARBONATE 300 MG/1
TABLET, FILM COATED, EXTENDED RELEASE ORAL
COMMUNITY
Start: 2025-08-07

## (undated) DEVICE — GLOVE SURG SZ 6 THK91MIL LTX FREE SYN POLYISOPRENE ANTI

## (undated) DEVICE — VAGINAL PREP TRAY: Brand: MEDLINE INDUSTRIES, INC.

## (undated) DEVICE — ELECTRODE ELECSURG SPATULA 36 CM CRV SLD PTFE COAT CLEANCOAT

## (undated) DEVICE — TROCAR: Brand: KII FIOS FIRST ENTRY

## (undated) DEVICE — DEVICE SUT SHFT L34CM DIA 10MM 2 JAW LD UNIT ENDOSTCH

## (undated) DEVICE — TOWEL,OR,DSP,ST,BLUE,STD,6/PK,12PK/CS: Brand: MEDLINE

## (undated) DEVICE — DRAINBAG,ANTI-REFLUX TOWER,L/F,2000ML,LL: Brand: MEDLINE

## (undated) DEVICE — TRAY 0 DEG STRYKER 5MM/10MM LENS REUSABLE

## (undated) DEVICE — ELECTRODE ENDOSCP L36CM PTFE SPAT CRV DISP CLEANCOAT

## (undated) DEVICE — LAPAROSCOPIC SCISSORS: Brand: EPIX LAPAROSCOPIC SCISSORS

## (undated) DEVICE — INSUFFLATION TUBING SET WITH FILTER, FUNNEL CONNECTOR AND LUER LOCK: Brand: JOSNOE MEDICAL INC

## (undated) DEVICE — GOWN,SIRUS,FABRNF,XL,20/CS: Brand: MEDLINE

## (undated) DEVICE — CAMERA STRYKER 1488

## (undated) DEVICE — INSTRUMENT POUCH: Brand: DEROYAL

## (undated) DEVICE — PUMP SUC IRR TBNG L10FT W/ HNDPC ASSEMB STRYKEFLOW 2

## (undated) DEVICE — DOUBLE BASIN SET: Brand: MEDLINE INDUSTRIES, INC.

## (undated) DEVICE — CLOTH SURG PREP PREOPERATIVE CHLORHEXIDINE GLUC 2% READYPREP

## (undated) DEVICE — ADHESIVE SKIN CLSR 0.7ML TOP DERMBND ADV

## (undated) DEVICE — COVER HNDL LT DISP

## (undated) DEVICE — ELECTRODE PT RET AD L9FT HI MOIST COND ADH HYDRGEL CORDED

## (undated) DEVICE — Z DUP USE 2431569 PATIENT POSITIONING KIT SURGYPAD

## (undated) DEVICE — SYRINGE MED 10ML POLYPR LUERSLIP TIP FLAT TOP W/O SFTY DISP

## (undated) DEVICE — PACK PROCEDURE SURG GEN CUST

## (undated) DEVICE — PACK,AURORA,LAVH: Brand: MEDLINE

## (undated) DEVICE — TIP IU L8CM DIA6.7MM BLU SIL FLX DISP RUMI II

## (undated) DEVICE — KIT,ANTI FOG,W/SPONGE & FLUID,SOFT PACK: Brand: MEDLINE

## (undated) DEVICE — BLADE,STAINLESS-STEEL,11,STRL,DISPOSABLE: Brand: MEDLINE

## (undated) DEVICE — LAPAROSCOPE 30 DEGREE 5MM

## (undated) DEVICE — DEVICE SUT W/ V-LOC SUT SZ 2-0 L8IN ABSRB LD UNIT SUT BARB

## (undated) DEVICE — SYRINGE MED 10ML LUERLOCK TIP W/O SFTY DISP

## (undated) DEVICE — TROCAR: Brand: KII® SLEEVE

## (undated) DEVICE — CATHETER,FOLEY,SILI-ELAST,LTX,16FR,10ML: Brand: MEDLINE

## (undated) DEVICE — Device

## (undated) DEVICE — PAD,SANITARY,11 IN,MAXI,N-STRL,IND WRAP: Brand: MEDLINE

## (undated) DEVICE — PLUMEPORT LAPAROSCOPIC SMOKE FILTRATION DEVICE: Brand: PLUMEPORT ACTIV

## (undated) DEVICE — APPLICATOR SURG XL L38CM FOR ARISTA ABSRB HEMSTAT FLEXITIP

## (undated) DEVICE — APPLICATOR MEDICATED 26 CC SOLUTION HI LT ORNG CHLORAPREP

## (undated) DEVICE — SEALER ENDOSCP L37CM NANO COAT BLNT TIP LAP DIV

## (undated) DEVICE — COVER,LIGHT HANDLE,FLX,2/PK: Brand: MEDLINE INDUSTRIES, INC.

## (undated) DEVICE — OCCLUDER UTER DISP COLPO-PNEUMO

## (undated) DEVICE — INTENDED FOR TISSUE SEPARATION, AND OTHER PROCEDURES THAT REQUIRE A SHARP SURGICAL BLADE TO PUNCTURE OR CUT.: Brand: BARD-PARKER ® STAINLESS STEEL BLADES

## (undated) DEVICE — CATHETER F BLLN 5CC 14FR 2 W HYDRGEL COAT LESS TRAUM LUB

## (undated) DEVICE — SYRINGE, LUER LOCK, 10ML: Brand: MEDLINE

## (undated) DEVICE — GOWN,SIRUS,FABRNF,L,20/CS: Brand: MEDLINE

## (undated) DEVICE — 4-PORT MANIFOLD: Brand: NEPTUNE 2

## (undated) DEVICE — SYRINGE IRRIG 60ML SFT PLIABLE BLB EZ TO GRP 1 HND USE W/

## (undated) DEVICE — INSUFFLATION NEEDLE TO ESTABLISH PNEUMOPERITONEUM.: Brand: INSUFFLATION NEEDLE

## (undated) DEVICE — GLOVE SURG SZ 6 CRM LTX FREE POLYISOPRENE POLYMER BEAD ANTI

## (undated) DEVICE — GLOVE ORANGE PI 7   MSG9070

## (undated) DEVICE — AGENT HEMSTAT 3GM PURIFIED PLNT STARCH PWD ABSRB ARISTA AH

## (undated) DEVICE — TRAY PROCED I  GYN

## (undated) DEVICE — SYRINGE MED 50ML LUERLOCK TIP

## (undated) DEVICE — LIQUIBAND RAPID ADHESIVE 36/CS 0.8ML: Brand: MEDLINE

## (undated) DEVICE — NEEDLE CLOSURE OMNICLOSE

## (undated) DEVICE — SOLUTION IRRIG 3000ML 0.9% SOD CHL USP UROMATIC PLAS CONT

## (undated) DEVICE — SOLUTION IRRIG 1000ML 0.9% SOD CHL USP POUR PLAS BTL

## (undated) DEVICE — Z INACTIVE USE 2735373 APPLICATOR FBR LAIN COT WOOD TIP ECONOMICAL

## (undated) DEVICE — SHEARS ENDOSCP L36CM DIA5MM ULTRASONIC CRV TIP HARM

## (undated) DEVICE — JELLY,LUBE,STERILE,FLIP TOP,TUBE,2-OZ: Brand: MEDLINE

## (undated) DEVICE — TRAY SET D

## (undated) DEVICE — INSTRUMENT CLAMP TOWEL LARGE REUSABLE

## (undated) DEVICE — SYRINGE 20ML LL S/C 50